# Patient Record
Sex: FEMALE | Race: WHITE | NOT HISPANIC OR LATINO | Employment: OTHER | URBAN - NONMETROPOLITAN AREA
[De-identification: names, ages, dates, MRNs, and addresses within clinical notes are randomized per-mention and may not be internally consistent; named-entity substitution may affect disease eponyms.]

---

## 2018-02-12 ENCOUNTER — HOSPITAL ENCOUNTER (OUTPATIENT)
Dept: GENERAL RADIOLOGY | Facility: HOSPITAL | Age: 73
Discharge: HOME | End: 2018-02-12
Attending: INTERNAL MEDICINE | Admitting: INTERNAL MEDICINE

## 2018-02-12 ENCOUNTER — TRANSCRIBE ORDERS (OUTPATIENT)
Dept: GENERAL RADIOLOGY | Facility: HOSPITAL | Age: 73
End: 2018-02-12

## 2018-02-12 DIAGNOSIS — M54.50 ACUTE BILATERAL LOW BACK PAIN WITHOUT SCIATICA: ICD-10-CM

## 2018-02-12 DIAGNOSIS — M54.50 ACUTE BILATERAL LOW BACK PAIN WITHOUT SCIATICA: Primary | ICD-10-CM

## 2018-02-12 PROCEDURE — 72072 X-RAY EXAM THORAC SPINE 3VWS: CPT

## 2018-02-12 PROCEDURE — 72072 X-RAY EXAM THORAC SPINE 3VWS: CPT | Performed by: RADIOLOGY

## 2018-02-12 PROCEDURE — 72110 X-RAY EXAM L-2 SPINE 4/>VWS: CPT

## 2018-02-12 PROCEDURE — 72110 X-RAY EXAM L-2 SPINE 4/>VWS: CPT | Performed by: RADIOLOGY

## 2019-04-30 ENCOUNTER — TRANSCRIBE ORDERS (OUTPATIENT)
Dept: ADMINISTRATIVE | Facility: HOSPITAL | Age: 74
End: 2019-04-30

## 2019-04-30 ENCOUNTER — LAB (OUTPATIENT)
Dept: LAB | Facility: HOSPITAL | Age: 74
End: 2019-04-30

## 2019-04-30 DIAGNOSIS — H66.91 RIGHT OTITIS MEDIA, UNSPECIFIED OTITIS MEDIA TYPE: Primary | ICD-10-CM

## 2019-04-30 DIAGNOSIS — H66.91 RIGHT OTITIS MEDIA, UNSPECIFIED OTITIS MEDIA TYPE: ICD-10-CM

## 2019-04-30 PROCEDURE — 87205 SMEAR GRAM STAIN: CPT

## 2019-04-30 PROCEDURE — 87070 CULTURE OTHR SPECIMN AEROBIC: CPT

## 2019-05-03 LAB
BACTERIA SPEC AEROBE CULT: NORMAL
GRAM STN SPEC: NORMAL

## 2019-07-29 ENCOUNTER — HOSPITAL ENCOUNTER (OUTPATIENT)
Dept: BONE DENSITY | Facility: HOSPITAL | Age: 74
Discharge: HOME | End: 2019-07-29
Admitting: NURSE PRACTITIONER

## 2019-07-29 DIAGNOSIS — M81.0 MENOPAUSAL OSTEOPOROSIS: ICD-10-CM

## 2019-07-29 PROCEDURE — 77080 DXA BONE DENSITY AXIAL: CPT

## 2019-07-29 PROCEDURE — 77080 DXA BONE DENSITY AXIAL: CPT | Performed by: RADIOLOGY

## 2020-07-23 ENCOUNTER — TRANSCRIBE ORDERS (OUTPATIENT)
Dept: ADMINISTRATIVE | Facility: HOSPITAL | Age: 75
End: 2020-07-23

## 2020-07-23 DIAGNOSIS — R42 DIZZINESS: Primary | ICD-10-CM

## 2020-07-23 DIAGNOSIS — G45.9 TRANSIENT ISCHEMIC ATTACK (TIA): ICD-10-CM

## 2020-07-28 ENCOUNTER — HOSPITAL ENCOUNTER (OUTPATIENT)
Dept: CARDIOLOGY | Facility: HOSPITAL | Age: 75
Discharge: HOME | End: 2020-07-28

## 2020-07-28 ENCOUNTER — HOSPITAL ENCOUNTER (OUTPATIENT)
Dept: CARDIOLOGY | Facility: HOSPITAL | Age: 75
Discharge: HOME | End: 2020-07-28
Admitting: INTERNAL MEDICINE

## 2020-07-28 DIAGNOSIS — G45.9 TRANSIENT ISCHEMIC ATTACK (TIA): ICD-10-CM

## 2020-07-28 DIAGNOSIS — R42 DIZZINESS: ICD-10-CM

## 2020-07-28 LAB
BH CV ECHO MEAS - % IVS THICK: 5.6 %
BH CV ECHO MEAS - % LVPW THICK: 57.8 %
BH CV ECHO MEAS - ACS: 2.2 CM
BH CV ECHO MEAS - AO MAX PG: 6.3 MMHG
BH CV ECHO MEAS - AO MEAN PG: 3 MMHG
BH CV ECHO MEAS - AO ROOT AREA (BSA CORRECTED): 1.9
BH CV ECHO MEAS - AO ROOT AREA: 9.1 CM^2
BH CV ECHO MEAS - AO ROOT DIAM: 3.4 CM
BH CV ECHO MEAS - AO V2 MAX: 125 CM/SEC
BH CV ECHO MEAS - AO V2 MEAN: 85.2 CM/SEC
BH CV ECHO MEAS - AO V2 VTI: 31.1 CM
BH CV ECHO MEAS - BSA(HAYCOCK): 1.8 M^2
BH CV ECHO MEAS - BSA: 1.8 M^2
BH CV ECHO MEAS - BZI_BMI: 26.1 KILOGRAMS/M^2
BH CV ECHO MEAS - BZI_METRIC_HEIGHT: 165.1 CM
BH CV ECHO MEAS - BZI_METRIC_WEIGHT: 71.2 KG
BH CV ECHO MEAS - EDV(CUBED): 73.6 ML
BH CV ECHO MEAS - EDV(MOD-SP4): 47.6 ML
BH CV ECHO MEAS - EDV(TEICH): 78.1 ML
BH CV ECHO MEAS - EF(CUBED): 61.4 %
BH CV ECHO MEAS - EF(MOD-SP4): 66.8 %
BH CV ECHO MEAS - EF(TEICH): 53.4 %
BH CV ECHO MEAS - ESV(CUBED): 28.4 ML
BH CV ECHO MEAS - ESV(MOD-SP4): 15.8 ML
BH CV ECHO MEAS - ESV(TEICH): 36.4 ML
BH CV ECHO MEAS - FS: 27.2 %
BH CV ECHO MEAS - IVS/LVPW: 1.2
BH CV ECHO MEAS - IVSD: 1.2 CM
BH CV ECHO MEAS - IVSS: 1.3 CM
BH CV ECHO MEAS - LA DIMENSION: 2.4 CM
BH CV ECHO MEAS - LA/AO: 0.71
BH CV ECHO MEAS - LV DIASTOLIC VOL/BSA (35-75): 26.7 ML/M^2
BH CV ECHO MEAS - LV MASS(C)D: 160.1 GRAMS
BH CV ECHO MEAS - LV MASS(C)DI: 89.7 GRAMS/M^2
BH CV ECHO MEAS - LV MASS(C)S: 150.5 GRAMS
BH CV ECHO MEAS - LV MASS(C)SI: 84.3 GRAMS/M^2
BH CV ECHO MEAS - LV SYSTOLIC VOL/BSA (12-30): 8.9 ML/M^2
BH CV ECHO MEAS - LVIDD: 4.2 CM
BH CV ECHO MEAS - LVIDS: 3.1 CM
BH CV ECHO MEAS - LVLD AP4: 6.9 CM
BH CV ECHO MEAS - LVLS AP4: 6 CM
BH CV ECHO MEAS - LVOT AREA (M): 3.1 CM^2
BH CV ECHO MEAS - LVOT AREA: 3.1 CM^2
BH CV ECHO MEAS - LVOT DIAM: 2 CM
BH CV ECHO MEAS - LVPWD: 1 CM
BH CV ECHO MEAS - LVPWS: 1.6 CM
BH CV ECHO MEAS - MV A MAX VEL: 67.8 CM/SEC
BH CV ECHO MEAS - MV E MAX VEL: 72.7 CM/SEC
BH CV ECHO MEAS - MV E/A: 1.1
BH CV ECHO MEAS - PA ACC TIME: 0.11 SEC
BH CV ECHO MEAS - PA PR(ACCEL): 28.2 MMHG
BH CV ECHO MEAS - RAP SYSTOLE: 10 MMHG
BH CV ECHO MEAS - RVSP: 28.4 MMHG
BH CV ECHO MEAS - SI(AO): 158.2 ML/M^2
BH CV ECHO MEAS - SI(CUBED): 25.3 ML/M^2
BH CV ECHO MEAS - SI(MOD-SP4): 17.8 ML/M^2
BH CV ECHO MEAS - SI(TEICH): 23.4 ML/M^2
BH CV ECHO MEAS - SV(AO): 282.4 ML
BH CV ECHO MEAS - SV(CUBED): 45.2 ML
BH CV ECHO MEAS - SV(MOD-SP4): 31.8 ML
BH CV ECHO MEAS - SV(TEICH): 41.7 ML
BH CV ECHO MEAS - TR MAX VEL: 214.7 CM/SEC
MAXIMAL PREDICTED HEART RATE: 144 BPM
STRESS TARGET HR: 122 BPM

## 2020-07-28 PROCEDURE — 93306 TTE W/DOPPLER COMPLETE: CPT | Performed by: INTERNAL MEDICINE

## 2020-07-28 PROCEDURE — 93306 TTE W/DOPPLER COMPLETE: CPT

## 2020-07-28 PROCEDURE — 93880 EXTRACRANIAL BILAT STUDY: CPT | Performed by: RADIOLOGY

## 2020-07-28 PROCEDURE — 93880 EXTRACRANIAL BILAT STUDY: CPT

## 2020-08-19 ENCOUNTER — OFFICE VISIT (OUTPATIENT)
Dept: CARDIAC SURGERY | Facility: CLINIC | Age: 75
End: 2020-08-19

## 2020-08-19 VITALS
BODY MASS INDEX: 24.49 KG/M2 | OXYGEN SATURATION: 97 % | SYSTOLIC BLOOD PRESSURE: 170 MMHG | WEIGHT: 147 LBS | HEIGHT: 65 IN | HEART RATE: 70 BPM | TEMPERATURE: 97.5 F | DIASTOLIC BLOOD PRESSURE: 110 MMHG

## 2020-08-19 DIAGNOSIS — I65.22 STENOSIS OF LEFT CAROTID ARTERY: Primary | ICD-10-CM

## 2020-08-19 PROCEDURE — 99204 OFFICE O/P NEW MOD 45 MIN: CPT | Performed by: THORACIC SURGERY (CARDIOTHORACIC VASCULAR SURGERY)

## 2020-08-19 RX ORDER — SIMVASTATIN 10 MG
10 TABLET ORAL EVERY OTHER DAY
COMMUNITY
Start: 2015-04-08

## 2020-08-19 RX ORDER — ASPIRIN 81 MG/1
81 TABLET ORAL EVERY 4 HOURS PRN
COMMUNITY

## 2020-08-19 RX ORDER — LISINOPRIL 10 MG/1
10 TABLET ORAL DAILY
COMMUNITY

## 2020-08-19 NOTE — PROGRESS NOTES
08/19/2020  Patient Information  [place] [address]   1945  'PCP/Referring Physician'  Dustin Cruz MD  [phone]  Dustin Cruz MD  [phone]  Chief Complaint   Patient presents with   • Consult     NP per Dr. Dustin Cruz for Left Carotid Stenosis-Right Sided Facial Drooping       History of Present Illness: 75-year-old  female with a history of hypertension, hyperlipidemia and prediabetes who presents with perioral numbness.  Approximately 2 months ago, the patient had a transient episode of right perioral numbness lasting approximately 1 hour with spontaneous resolution.  The patient had a repeat episode approximately a month ago.  She denies any vision loss, speaking difficulties or focal weakness.  The patient's father had an occluded carotid artery and her mother had significant carotid artery stenosis that was stented with periprocedural stroke.      There is no problem list on file for this patient.    Past Medical History:   Diagnosis Date   • Arthritis    • Carotid stenosis    • GERD (gastroesophageal reflux disease)    • Hyperlipidemia    • Hypertension      Past Surgical History:   Procedure Laterality Date   • CATARACT EXTRACTION, BILATERAL     • GALLBLADDER SURGERY     • HYSTERECTOMY         Current Outpatient Medications:   •  aspirin 81 MG EC tablet, Take 81 mg by mouth Daily., Disp: , Rfl:   •  lisinopril (PRINIVIL,ZESTRIL) 10 MG tablet, Take 10 mg by mouth Daily., Disp: , Rfl:   •  simvastatin (ZOCOR) 10 MG tablet, Take  by mouth Every Other Day., Disp: , Rfl:   Allergies   Allergen Reactions   • Penicillins Hives     Social History     Socioeconomic History   • Marital status:      Spouse name: Not on file   • Number of children: 3   • Years of education: Not on file   • Highest education level: Not on file   Occupational History   •  "Occupation:      Comment: Retired   Tobacco Use   • Smoking status: Never Smoker   • Smokeless tobacco: Never Used   Substance and Sexual Activity   • Alcohol use: Not Currently     Frequency: Never   • Drug use: Never   Social History Narrative    Lives in [place].      Family History   Problem Relation Age of Onset   • Thyroid cancer Mother    • Kidney cancer Mother    • Other Father      Review of Systems   Constitution: Positive for malaise/fatigue. Negative for chills, fever, night sweats and weight loss.   HENT: Negative for hearing loss, odynophagia and sore throat.    Cardiovascular: Positive for leg swelling. Negative for chest pain, dyspnea on exertion, orthopnea and palpitations.   Respiratory: Negative for cough and hemoptysis.    Endocrine: Negative for cold intolerance, heat intolerance, polydipsia, polyphagia and polyuria.   Hematologic/Lymphatic: Does not bruise/bleed easily.   Skin: Negative for itching and rash.   Musculoskeletal: Positive for arthritis and joint pain. Negative for joint swelling and myalgias.   Gastrointestinal: Negative for abdominal pain, constipation, diarrhea, hematemesis, hematochezia, melena, nausea and vomiting.   Genitourinary: Negative for dysuria, frequency and hematuria.   Neurological: Positive for light-headedness. Negative for focal weakness, headaches, numbness and seizures.   Psychiatric/Behavioral: Negative for suicidal ideas.   All other systems reviewed and are negative.    Vitals:    08/19/20 0926   BP: (!) 170/110   BP Location: Left arm   Patient Position: Sitting   Pulse: 70   Temp: 97.5 °F (36.4 °C)   SpO2: 97%   Weight: 66.7 kg (147 lb)   Height: 165.1 cm (65\")      Physical Exam   Constitutional: She is oriented to person, place, and time. She appears well-developed and well-nourished. No distress.    female who appears stated age and is present with her    HENT:   Head: Normocephalic and atraumatic.   Eyes: Conjunctivae are " normal. No scleral icterus.   Neck: Normal range of motion. No JVD present. Carotid bruit is not present. No tracheal deviation present.   Cardiovascular: Normal rate, regular rhythm and normal heart sounds. Exam reveals no gallop and no friction rub.   No murmur heard.  Pulmonary/Chest: Effort normal and breath sounds normal. No stridor. No respiratory distress. She has no wheezes. She has no rales.   Abdominal: Soft. She exhibits no distension and no mass. There is no tenderness. There is no rebound and no guarding.   Musculoskeletal: Normal range of motion. She exhibits no edema.   Neurological: She is alert and oriented to person, place, and time.   Skin: Skin is warm and dry. No rash noted. She is not diaphoretic. No erythema.   Psychiatric: She has a normal mood and affect. Her behavior is normal. Judgment and thought content normal.       The past medical history, surgical history, family history, social history, review of systems and vitals were reviewed by myself and corrected as needed.      Labs/Imaging:  -Carotid duplex performed 7/28/2020, personally reviewed, demonstrates less than 50% right carotid artery stenosis and greater than 80% left internal carotid artery stenosis.  There is antegrade vertebral flow bilaterally.  The left internal carotid artery peak systolic velocity is 396 cm/s    Assessment/Plan:  75-year-old  female with a history of hypertension, hyperlipidemia and prediabetes who presents with perioral numbness.  She has significant left carotid artery stenosis.  I will obtain a CTA of the carotids to better evaluate her anatomy before surgery.  An MRI of the brain will be obtained to rule out stroke.  I discussed with the patient performing a left carotid endarterectomy.  The risks and benefits of surgery were discussed with the patient including pain, bleeding, infection, stroke, hoarseness, dysphasia, myocardial infarction and death.  The patient understood these risks and  wished to proceed with surgery.    There is no problem list on file for this patient.

## 2020-09-08 ENCOUNTER — HOSPITAL ENCOUNTER (OUTPATIENT)
Dept: MRI IMAGING | Facility: HOSPITAL | Age: 75
Discharge: HOME | End: 2020-09-08
Admitting: THORACIC SURGERY (CARDIOTHORACIC VASCULAR SURGERY)

## 2020-09-08 PROCEDURE — 70551 MRI BRAIN STEM W/O DYE: CPT | Performed by: RADIOLOGY

## 2020-09-08 PROCEDURE — 70551 MRI BRAIN STEM W/O DYE: CPT

## 2020-09-11 ENCOUNTER — HOSPITAL ENCOUNTER (OUTPATIENT)
Dept: CT IMAGING | Facility: HOSPITAL | Age: 75
Discharge: HOME | End: 2020-09-11
Admitting: THORACIC SURGERY (CARDIOTHORACIC VASCULAR SURGERY)

## 2020-09-11 LAB — CREAT BLDA-MCNC: 0.7 MG/DL (ref 0.6–1.3)

## 2020-09-11 PROCEDURE — 70498 CT ANGIOGRAPHY NECK: CPT | Performed by: RADIOLOGY

## 2020-09-11 PROCEDURE — 70498 CT ANGIOGRAPHY NECK: CPT

## 2020-09-11 PROCEDURE — 82565 ASSAY OF CREATININE: CPT

## 2020-09-11 PROCEDURE — 0 IOVERSOL 68 % SOLUTION: Performed by: THORACIC SURGERY (CARDIOTHORACIC VASCULAR SURGERY)

## 2020-09-11 RX ADMIN — IOVERSOL 80 ML: 678 INJECTION INTRA-ARTERIAL; INTRAVENOUS at 11:47

## 2020-09-15 ENCOUNTER — TELEPHONE (OUTPATIENT)
Dept: CARDIAC SURGERY | Facility: CLINIC | Age: 75
End: 2020-09-15

## 2020-09-15 NOTE — TELEPHONE ENCOUNTER
----- Message from Gerardo Paige MD sent at 9/14/2020  4:44 PM EDT -----  Ok to book.  Thanks  ----- Message -----  From: Jacobo Dinh  Sent: 9/14/2020   1:50 PM EDT  To: MD Dr. Grecia Malcolm, you saw this pt on 08/19/2020-she had a CTA Carotid & MRI Brain -both are now in Epic for review. Is it ok to proceed with L CEA? Thanks.

## 2020-09-15 NOTE — TELEPHONE ENCOUNTER
I called Delia today to try and schedule her L CEA procedure with Dr. Paige. Delia had a CTA or the carotids & MRI on the brain-please refer to Dr. Paige Tele enc response from 09/15. Delia was very persistent that she see Dr. Paige again before I schedule surgery. I explained that for an apt at our [place] office would be out into November but I could get here in at the [place] office in October. She agreed to come to [place], she is aware of that apt date and time.

## 2020-10-20 ENCOUNTER — OFFICE VISIT (OUTPATIENT)
Dept: CARDIAC SURGERY | Facility: CLINIC | Age: 75
End: 2020-10-20

## 2020-10-20 VITALS
OXYGEN SATURATION: 98 % | DIASTOLIC BLOOD PRESSURE: 101 MMHG | TEMPERATURE: 97.2 F | SYSTOLIC BLOOD PRESSURE: 176 MMHG | HEIGHT: 65 IN | BODY MASS INDEX: 24.49 KG/M2 | WEIGHT: 147 LBS | HEART RATE: 71 BPM

## 2020-10-20 DIAGNOSIS — I65.22 STENOSIS OF LEFT CAROTID ARTERY: Primary | ICD-10-CM

## 2020-10-20 PROCEDURE — 99213 OFFICE O/P EST LOW 20 MIN: CPT | Performed by: NURSE PRACTITIONER

## 2020-10-20 NOTE — PROGRESS NOTES
[facility] Cardiothoracic Surgery Office Follow Up Note     Date of Encounter: 10/20/2020     MRN Number: [id number]  Name: Delia Rodriguez  Phone Number: [phone]     Referred By: No ref. provider found  PCP: Dustin Cruz MD    Chief Complaint:    Chief Complaint   Patient presents with   • Follow-up     Follow up to discuss left carotid endarterectomy.   • Carotid Artery Disease       History of Present Illness:    Delia Rodriguez is a 75 y.o. female with a history of hyperlipidemia, hypertension, prediabetes and left carotid stenosis.  She presents today for review of her CTA of the carotids and MRI brain as well as discussion of left carotid endarterectomy.   MRI of the brain 9/8/2020 with age-appropriate cerebral atrophy and no areas of ischemia.  CTA of the carotids revealed no significant stenosis stenosis in the right carotid artery and high-grade 75% stenosis of the proximal left internal carotid artery.  Patient with history of perioral numbness.  She denies any new TIA/CVA symptoms.  Patient's father had occluded carotid artery and her mother had a significant carotid artery stenosis that was stented with periprocedural stroke.  Patient has had uncontrolled blood pressures prior to her dental procedure recently in which it was canceled. Elevated blood pressure in office today of 176/101.  She has not followed up with her primary care provider as advised by her dentist for evaluation.    Review of Systems:  Review of Systems   Constitution: Positive for malaise/fatigue. Negative for chills, decreased appetite, diaphoresis, fever, night sweats and weight loss.   HENT: Negative for congestion, hoarse voice, sore throat and stridor.    Cardiovascular: Positive for leg swelling (ankles). Negative for chest pain, claudication, dyspnea on exertion, irregular heartbeat, near-syncope, orthopnea, palpitations, paroxysmal nocturnal dyspnea and syncope.   Respiratory: Negative.  Negative for  "cough, hemoptysis, shortness of breath, sleep disturbances due to breathing, snoring, sputum production and wheezing.    Hematologic/Lymphatic: Negative for adenopathy and bleeding problem. Bruises/bleeds easily.   Skin: Negative.  Negative for color change, dry skin, itching, poor wound healing and rash.   Musculoskeletal: Positive for arthritis. Negative for back pain, falls and muscle weakness.   Gastrointestinal: Positive for abdominal pain. Negative for anorexia, constipation, diarrhea, hematochezia, melena, nausea and vomiting.   Neurological: Positive for dizziness, headaches, light-headedness and loss of balance. Negative for difficulty with concentration, disturbances in coordination, numbness, seizures, vertigo and weakness.   Psychiatric/Behavioral: Negative.  Negative for altered mental status, depression, memory loss and substance abuse. The patient does not have insomnia and is not nervous/anxious.    Allergic/Immunologic: Positive for environmental allergies. Negative for persistent infections.       I have reviewed the review of systems as entered by my clinical support staff and have updated it as appropriate.       Allergies:  Allergies   Allergen Reactions   • Penicillins Hives       Medications:      Current Outpatient Medications:   •  aspirin 81 MG EC tablet, Take 81 mg by mouth Daily., Disp: , Rfl:   •  lisinopril (PRINIVIL,ZESTRIL) 10 MG tablet, Take 10 mg by mouth Daily., Disp: , Rfl:   •  simvastatin (ZOCOR) 10 MG tablet, Take  by mouth Every Other Day., Disp: , Rfl:     Physical Exam:  Vital Signs:    Vitals:    10/20/20 1028   BP: (!) 176/101   BP Location: Right arm   Patient Position: Sitting   Pulse: 71   Temp: 97.2 °F (36.2 °C)   SpO2: 98%   Weight: 66.7 kg (147 lb)   Height: 165.1 cm (65\")     Body mass index is 24.46 kg/m².     Physical Exam  Vitals signs and nursing note reviewed.   Constitutional:       Appearance: Normal appearance.   HENT:      Head: Normocephalic and " atraumatic.      Nose: Nose normal.      Mouth/Throat:      Mouth: Mucous membranes are dry.   Neck:      Musculoskeletal: Neck supple.      Vascular: Carotid bruit (Left) present.   Cardiovascular:      Rate and Rhythm: Normal rate and regular rhythm.      Pulses:           Dorsalis pedis pulses are 2+ on the right side and 2+ on the left side.        Posterior tibial pulses are 2+ on the right side and 2+ on the left side.      Heart sounds: Normal heart sounds, S1 normal and S2 normal. No murmur. No friction rub. No gallop.    Pulmonary:      Effort: Pulmonary effort is normal.      Breath sounds: Normal breath sounds.   Abdominal:      General: Bowel sounds are normal.      Palpations: Abdomen is soft.      Tenderness: There is no abdominal tenderness.   Musculoskeletal: Normal range of motion.      Right lower leg: No edema.      Left lower leg: No edema.   Skin:     General: Skin is warm and dry.   Neurological:      General: No focal deficit present.      Mental Status: She is alert and oriented to person, place, and time.      Motor: Motor function is intact. No weakness.      Coordination: Coordination is intact.      Gait: Gait is intact.   Psychiatric:         Mood and Affect: Mood normal.         Behavior: Behavior normal. Behavior is cooperative.         Thought Content: Thought content normal.         Judgment: Judgment normal.         Labs/Imaging:    MRI Brain 9/8/20:  Age appropriate cerebral atrophy, no areas of recent ischemia.  No focal brain parenchymal abnormalities were identified  CT angio carotid 9/11/20:  No significant stenosis of right carotid.  High-grade 75 % stenosis of the left proximal internal carotid artery      Assessment / Plan:  Delia Rodriguez is a 75 y.o. female with a history of hyperlipidemia, hypertension, prediabetes and left carotid stenosis.  Pt with noted High-grade 75 % stenosis of the left proximal internal carotid artery.  She denies any new TIA/CVA symptoms.   Advised patient to call primary care provider and make appointment for uncontrolled blood pressure and her  was able to make appointment for Thursday, during office visit, for evaluation prior to surgery.  Will have patient return for left carotid endarterectomy with EEG.  The risk and benefits of surgery were discussed with the patient/family including pain, bleeding, infection, stroke, hoarseness, dysphagia, myocardial infarction and death.  Patient understands these risks and wishes to proceed.    Starr Richter, [facility] Cardiothoracic Surgery    Please note that portions of this note may have been completed with a voice recognition program. Efforts were made to edit the dictations, but occasionally words are mistranscribed.

## 2020-10-30 ENCOUNTER — PREP FOR SURGERY (OUTPATIENT)
Dept: OTHER | Facility: HOSPITAL | Age: 75
End: 2020-10-30

## 2020-10-30 DIAGNOSIS — I65.22 STENOSIS OF LEFT CAROTID ARTERY: Primary | ICD-10-CM

## 2020-11-02 ENCOUNTER — PRE-ADMISSION TESTING (OUTPATIENT)
Dept: PREADMISSION TESTING | Facility: HOSPITAL | Age: 75
End: 2020-11-02

## 2020-11-02 ENCOUNTER — HOSPITAL ENCOUNTER (OUTPATIENT)
Dept: GENERAL RADIOLOGY | Facility: HOSPITAL | Age: 75
Discharge: HOME | End: 2020-11-02

## 2020-11-02 ENCOUNTER — APPOINTMENT (OUTPATIENT)
Dept: PREADMISSION TESTING | Facility: HOSPITAL | Age: 75
End: 2020-11-02

## 2020-11-02 VITALS — WEIGHT: 145.6 LBS | BODY MASS INDEX: 23.4 KG/M2 | HEIGHT: 66 IN

## 2020-11-02 DIAGNOSIS — I65.22 STENOSIS OF LEFT CAROTID ARTERY: ICD-10-CM

## 2020-11-02 LAB
ABO GROUP BLD: NORMAL
ANION GAP SERPL CALCULATED.3IONS-SCNC: 13 MMOL/L (ref 5–15)
BLD GP AB SCN SERPL QL: NEGATIVE
BUN SERPL-MCNC: 15 MG/DL (ref 8–23)
BUN/CREAT SERPL: 23.4 (ref 7–25)
CALCIUM SPEC-SCNC: 9.7 MG/DL (ref 8.6–10.5)
CHLORIDE SERPL-SCNC: 102 MMOL/L (ref 98–107)
CO2 SERPL-SCNC: 26 MMOL/L (ref 22–29)
CREAT SERPL-MCNC: 0.64 MG/DL (ref 0.57–1)
DEPRECATED RDW RBC AUTO: 48 FL (ref 37–54)
ERYTHROCYTE [DISTWIDTH] IN BLOOD BY AUTOMATED COUNT: 14.7 % (ref 12.3–15.4)
GFR SERPL CREATININE-BSD FRML MDRD: 90 ML/MIN/1.73
GLUCOSE SERPL-MCNC: 152 MG/DL (ref 65–99)
HBA1C MFR BLD: 5.8 % (ref 4.8–5.6)
HCT VFR BLD AUTO: 49.3 % (ref 34–46.6)
HGB BLD-MCNC: 15.2 G/DL (ref 12–15.9)
INR PPP: 0.98 (ref 0.85–1.16)
MCH RBC QN AUTO: 27.3 PG (ref 26.6–33)
MCHC RBC AUTO-ENTMCNC: 30.8 G/DL (ref 31.5–35.7)
MCV RBC AUTO: 88.7 FL (ref 79–97)
PLATELET # BLD AUTO: 442 10*3/MM3 (ref 140–450)
PMV BLD AUTO: 9.7 FL (ref 6–12)
POTASSIUM SERPL-SCNC: 4 MMOL/L (ref 3.5–5.2)
PROTHROMBIN TIME: 12.7 SECONDS (ref 11.5–14)
RBC # BLD AUTO: 5.56 10*6/MM3 (ref 3.77–5.28)
RH BLD: POSITIVE
SODIUM SERPL-SCNC: 141 MMOL/L (ref 136–145)
T&S EXPIRATION DATE: NORMAL
WBC # BLD AUTO: 10.91 10*3/MM3 (ref 3.4–10.8)

## 2020-11-02 PROCEDURE — 86850 RBC ANTIBODY SCREEN: CPT | Performed by: PHYSICIAN ASSISTANT

## 2020-11-02 PROCEDURE — 80048 BASIC METABOLIC PNL TOTAL CA: CPT | Performed by: PHYSICIAN ASSISTANT

## 2020-11-02 PROCEDURE — U0004 COV-19 TEST NON-CDC HGH THRU: HCPCS

## 2020-11-02 PROCEDURE — 83036 HEMOGLOBIN GLYCOSYLATED A1C: CPT | Performed by: PHYSICIAN ASSISTANT

## 2020-11-02 PROCEDURE — 86901 BLOOD TYPING SEROLOGIC RH(D): CPT | Performed by: PHYSICIAN ASSISTANT

## 2020-11-02 PROCEDURE — 36415 COLL VENOUS BLD VENIPUNCTURE: CPT

## 2020-11-02 PROCEDURE — 86900 BLOOD TYPING SEROLOGIC ABO: CPT | Performed by: PHYSICIAN ASSISTANT

## 2020-11-02 PROCEDURE — 85610 PROTHROMBIN TIME: CPT | Performed by: PHYSICIAN ASSISTANT

## 2020-11-02 PROCEDURE — 85027 COMPLETE CBC AUTOMATED: CPT | Performed by: PHYSICIAN ASSISTANT

## 2020-11-02 PROCEDURE — 71046 X-RAY EXAM CHEST 2 VIEWS: CPT

## 2020-11-02 PROCEDURE — 93010 ELECTROCARDIOGRAM REPORT: CPT | Performed by: INTERNAL MEDICINE

## 2020-11-02 PROCEDURE — C9803 HOPD COVID-19 SPEC COLLECT: HCPCS

## 2020-11-02 PROCEDURE — 93005 ELECTROCARDIOGRAM TRACING: CPT

## 2020-11-02 RX ORDER — CHLORHEXIDINE GLUCONATE 500 MG/1
1 CLOTH TOPICAL EVERY 12 HOURS PRN
Status: CANCELLED | OUTPATIENT
Start: 2020-11-02

## 2020-11-02 RX ORDER — AMLODIPINE BESYLATE 5 MG/1
5 TABLET ORAL DAILY
COMMUNITY
End: 2023-03-08

## 2020-11-02 RX ORDER — HYDRALAZINE HYDROCHLORIDE 25 MG/1
25 TABLET, FILM COATED ORAL 3 TIMES DAILY
COMMUNITY
End: 2023-03-08 | Stop reason: ALTCHOICE

## 2020-11-02 RX ORDER — VANCOMYCIN HYDROCHLORIDE 1 G/200ML
15 INJECTION, SOLUTION INTRAVENOUS ONCE
Status: CANCELLED | OUTPATIENT
Start: 2020-11-05 | End: 2020-11-05

## 2020-11-02 NOTE — PAT
Patient to apply Chlorhexadine wipes  to surgical area (as instructed) the night before procedure and the AM of procedure. Wipes provided.    Per Anesthesia Request, patient instructed not to take their ACE/ARB medications on the AM of surgery.    Blood bank bracelet applied to patient during Pre Admission Testing visit.  Patient instructed not to remove from arm until after procedure and they are discharged from the hospital.  Explained to patient that they may shower and get the bracelet wet, but not to immerse under water for longer periods (bathing, swimming, hand dishwashing, etc).  Patient verbalized understanding.

## 2020-11-03 LAB
QT INTERVAL: 360 MS
QTC INTERVAL: 423 MS
SARS-COV-2 RNA RESP QL NAA+PROBE: NOT DETECTED

## 2020-11-04 ENCOUNTER — TELEPHONE (OUTPATIENT)
Dept: CARDIAC SURGERY | Facility: CLINIC | Age: 75
End: 2020-11-04

## 2020-11-04 ENCOUNTER — ANESTHESIA EVENT (OUTPATIENT)
Dept: PERIOP | Facility: HOSPITAL | Age: 75
End: 2020-11-04

## 2020-11-04 RX ORDER — FAMOTIDINE 10 MG/ML
20 INJECTION, SOLUTION INTRAVENOUS ONCE
Status: CANCELLED | OUTPATIENT
Start: 2020-11-04 | End: 2020-11-04

## 2020-11-04 NOTE — TELEPHONE ENCOUNTER
Delia is aware of her new arrival time for her surgery with Dr. Paige on 11/05-new arrival time is 10:00am.

## 2020-11-05 ENCOUNTER — APPOINTMENT (OUTPATIENT)
Dept: NEUROLOGY | Facility: HOSPITAL | Age: 75
End: 2020-11-05

## 2020-11-05 ENCOUNTER — HOSPITAL ENCOUNTER (INPATIENT)
Facility: HOSPITAL | Age: 75
LOS: 1 days | Discharge: HOME | End: 2020-11-06
Attending: THORACIC SURGERY (CARDIOTHORACIC VASCULAR SURGERY) | Admitting: THORACIC SURGERY (CARDIOTHORACIC VASCULAR SURGERY)

## 2020-11-05 ENCOUNTER — ANESTHESIA (OUTPATIENT)
Dept: PERIOP | Facility: HOSPITAL | Age: 75
End: 2020-11-05

## 2020-11-05 DIAGNOSIS — I65.22 STENOSIS OF LEFT CAROTID ARTERY: ICD-10-CM

## 2020-11-05 LAB
GLUCOSE BLDC GLUCOMTR-MCNC: 114 MG/DL (ref 70–130)
GLUCOSE BLDC GLUCOMTR-MCNC: 139 MG/DL (ref 70–130)
GLUCOSE BLDC GLUCOMTR-MCNC: 183 MG/DL (ref 70–130)

## 2020-11-05 PROCEDURE — 25010000002 ONDANSETRON PER 1 MG: Performed by: NURSE ANESTHETIST, CERTIFIED REGISTERED

## 2020-11-05 PROCEDURE — 25010000002 PROPOFOL 10 MG/ML EMULSION: Performed by: NURSE ANESTHETIST, CERTIFIED REGISTERED

## 2020-11-05 PROCEDURE — 25010000002 HEPARIN (PORCINE) PER 1000 UNITS: Performed by: THORACIC SURGERY (CARDIOTHORACIC VASCULAR SURGERY)

## 2020-11-05 PROCEDURE — 25010000002 PHENYLEPHRINE PER 1 ML: Performed by: NURSE ANESTHETIST, CERTIFIED REGISTERED

## 2020-11-05 PROCEDURE — 25010000002 FENTANYL CITRATE (PF) 100 MCG/2ML SOLUTION: Performed by: NURSE ANESTHETIST, CERTIFIED REGISTERED

## 2020-11-05 PROCEDURE — 88311 DECALCIFY TISSUE: CPT | Performed by: THORACIC SURGERY (CARDIOTHORACIC VASCULAR SURGERY)

## 2020-11-05 PROCEDURE — 25010000002 NEOSTIGMINE 10 MG/10ML SOLUTION: Performed by: NURSE ANESTHETIST, CERTIFIED REGISTERED

## 2020-11-05 PROCEDURE — 03CL0ZZ EXTIRPATION OF MATTER FROM LEFT INTERNAL CAROTID ARTERY, OPEN APPROACH: ICD-10-PCS | Performed by: THORACIC SURGERY (CARDIOTHORACIC VASCULAR SURGERY)

## 2020-11-05 PROCEDURE — 25010000002 DEXAMETHASONE PER 1 MG: Performed by: NURSE ANESTHETIST, CERTIFIED REGISTERED

## 2020-11-05 PROCEDURE — 95955 EEG DURING SURGERY: CPT

## 2020-11-05 PROCEDURE — 35301 RECHANNELING OF ARTERY: CPT | Performed by: PHYSICIAN ASSISTANT

## 2020-11-05 PROCEDURE — 03CJ0ZZ EXTIRPATION OF MATTER FROM LEFT COMMON CAROTID ARTERY, OPEN APPROACH: ICD-10-PCS | Performed by: THORACIC SURGERY (CARDIOTHORACIC VASCULAR SURGERY)

## 2020-11-05 PROCEDURE — 95816 EEG AWAKE AND DROWSY: CPT

## 2020-11-05 PROCEDURE — 99233 SBSQ HOSP IP/OBS HIGH 50: CPT | Performed by: INTERNAL MEDICINE

## 2020-11-05 PROCEDURE — 25010000002 HEPARIN (PORCINE) PER 1000 UNITS: Performed by: NURSE ANESTHETIST, CERTIFIED REGISTERED

## 2020-11-05 PROCEDURE — 82962 GLUCOSE BLOOD TEST: CPT

## 2020-11-05 PROCEDURE — 25010000003 LIDOCAINE 1 % SOLUTION: Performed by: THORACIC SURGERY (CARDIOTHORACIC VASCULAR SURGERY)

## 2020-11-05 PROCEDURE — C1768 GRAFT, VASCULAR: HCPCS | Performed by: THORACIC SURGERY (CARDIOTHORACIC VASCULAR SURGERY)

## 2020-11-05 PROCEDURE — 35301 RECHANNELING OF ARTERY: CPT | Performed by: THORACIC SURGERY (CARDIOTHORACIC VASCULAR SURGERY)

## 2020-11-05 PROCEDURE — 25010000002 VANCOMYCIN PER 500 MG: Performed by: PHYSICIAN ASSISTANT

## 2020-11-05 PROCEDURE — 88304 TISSUE EXAM BY PATHOLOGIST: CPT | Performed by: THORACIC SURGERY (CARDIOTHORACIC VASCULAR SURGERY)

## 2020-11-05 PROCEDURE — 94799 UNLISTED PULMONARY SVC/PX: CPT

## 2020-11-05 DEVICE — VASCU-GUARD PERIPHERAL VASCULAR PATCH IS PREPARED FROM BOVINE PERICARDIUM WHICH IS CROSS-LINKED WITH GLUTARALDEHYDE. THE PERICARDIUM IS PROCURED FROM CATTLE ORIGINATING IN THE UNITED STATES. VASCU-GUARD PERIPHERAL VASCULAR PATCH IS CHEMICALLY STERILIZED USING ETHANOL AND PROPYLENE OXIDE. VASCU-GUARD PERIPHERAL VASCULAR PATCH HAS BEEN TREATED WITH 1 MOLAR SODIUM HYDROXIDE FOR A MINIMUM OF 60 MINUTES AT 20 - 25 C.  VASCU-GUARD PERIPHERAL VASCULAR PATCH IS PACKAGED IN A CONTAINER FILLED WITH STERILE, NON-PYROGENIC WATER CONTAINING PROPYLENE OXIDE. THE CONTENTS OF THE UNOPENED, UNDAMAGED CONTAINER ARE STERILE.
Type: IMPLANTABLE DEVICE | Site: NECK | Status: FUNCTIONAL
Brand: VASCU-GUARD PERIPHERAL VASCULAR PATCH

## 2020-11-05 RX ORDER — SODIUM CHLORIDE, SODIUM LACTATE, POTASSIUM CHLORIDE, CALCIUM CHLORIDE 600; 310; 30; 20 MG/100ML; MG/100ML; MG/100ML; MG/100ML
9 INJECTION, SOLUTION INTRAVENOUS CONTINUOUS
Status: DISCONTINUED | OUTPATIENT
Start: 2020-11-05 | End: 2020-11-06 | Stop reason: HOSPADM

## 2020-11-05 RX ORDER — SODIUM CHLORIDE 0.9 % (FLUSH) 0.9 %
10 SYRINGE (ML) INJECTION AS NEEDED
Status: DISCONTINUED | OUTPATIENT
Start: 2020-11-05 | End: 2020-11-05 | Stop reason: HOSPADM

## 2020-11-05 RX ORDER — ATORVASTATIN CALCIUM 10 MG/1
10 TABLET, FILM COATED ORAL NIGHTLY
Status: DISCONTINUED | OUTPATIENT
Start: 2020-11-05 | End: 2020-11-06 | Stop reason: HOSPADM

## 2020-11-05 RX ORDER — LIDOCAINE HYDROCHLORIDE 10 MG/ML
0.5 INJECTION, SOLUTION EPIDURAL; INFILTRATION; INTRACAUDAL; PERINEURAL ONCE AS NEEDED
Status: COMPLETED | OUTPATIENT
Start: 2020-11-05 | End: 2020-11-05

## 2020-11-05 RX ORDER — SODIUM CHLORIDE 0.9 % (FLUSH) 0.9 %
10 SYRINGE (ML) INJECTION AS NEEDED
Status: DISCONTINUED | OUTPATIENT
Start: 2020-11-05 | End: 2020-11-06 | Stop reason: HOSPADM

## 2020-11-05 RX ORDER — HYDROMORPHONE HYDROCHLORIDE 1 MG/ML
0.5 INJECTION, SOLUTION INTRAMUSCULAR; INTRAVENOUS; SUBCUTANEOUS
Status: DISCONTINUED | OUTPATIENT
Start: 2020-11-05 | End: 2020-11-05 | Stop reason: HOSPADM

## 2020-11-05 RX ORDER — SODIUM CHLORIDE 0.9 % (FLUSH) 0.9 %
3 SYRINGE (ML) INJECTION EVERY 12 HOURS SCHEDULED
Status: DISCONTINUED | OUTPATIENT
Start: 2020-11-05 | End: 2020-11-06 | Stop reason: HOSPADM

## 2020-11-05 RX ORDER — PHENYLEPHRINE HCL IN 0.9% NACL 0.5 MG/5ML
.5-3 SYRINGE (ML) INTRAVENOUS
Status: DISCONTINUED | OUTPATIENT
Start: 2020-11-05 | End: 2020-11-06 | Stop reason: HOSPADM

## 2020-11-05 RX ORDER — CLOPIDOGREL BISULFATE 75 MG/1
75 TABLET ORAL DAILY
Status: DISCONTINUED | OUTPATIENT
Start: 2020-11-06 | End: 2020-11-06 | Stop reason: HOSPADM

## 2020-11-05 RX ORDER — VANCOMYCIN HYDROCHLORIDE 1 G/200ML
15 INJECTION, SOLUTION INTRAVENOUS ONCE
Status: COMPLETED | OUTPATIENT
Start: 2020-11-05 | End: 2020-11-06

## 2020-11-05 RX ORDER — PROPOFOL 10 MG/ML
VIAL (ML) INTRAVENOUS AS NEEDED
Status: DISCONTINUED | OUTPATIENT
Start: 2020-11-05 | End: 2020-11-05 | Stop reason: SURG

## 2020-11-05 RX ORDER — DEXAMETHASONE SODIUM PHOSPHATE 4 MG/ML
INJECTION, SOLUTION INTRA-ARTICULAR; INTRALESIONAL; INTRAMUSCULAR; INTRAVENOUS; SOFT TISSUE AS NEEDED
Status: DISCONTINUED | OUTPATIENT
Start: 2020-11-05 | End: 2020-11-05 | Stop reason: SURG

## 2020-11-05 RX ORDER — MORPHINE SULFATE 2 MG/ML
2 INJECTION, SOLUTION INTRAMUSCULAR; INTRAVENOUS EVERY 4 HOURS PRN
Status: DISCONTINUED | OUTPATIENT
Start: 2020-11-05 | End: 2020-11-06 | Stop reason: HOSPADM

## 2020-11-05 RX ORDER — ROCURONIUM BROMIDE 10 MG/ML
INJECTION, SOLUTION INTRAVENOUS AS NEEDED
Status: DISCONTINUED | OUTPATIENT
Start: 2020-11-05 | End: 2020-11-05 | Stop reason: SURG

## 2020-11-05 RX ORDER — SODIUM CHLORIDE 450 MG/100ML
30 INJECTION, SOLUTION INTRAVENOUS CONTINUOUS
Status: DISCONTINUED | OUTPATIENT
Start: 2020-11-05 | End: 2020-11-06 | Stop reason: HOSPADM

## 2020-11-05 RX ORDER — LISINOPRIL 10 MG/1
10 TABLET ORAL NIGHTLY
Status: DISCONTINUED | OUTPATIENT
Start: 2020-11-05 | End: 2020-11-06 | Stop reason: HOSPADM

## 2020-11-05 RX ORDER — FENTANYL CITRATE 50 UG/ML
50 INJECTION, SOLUTION INTRAMUSCULAR; INTRAVENOUS
Status: DISCONTINUED | OUTPATIENT
Start: 2020-11-05 | End: 2020-11-05 | Stop reason: HOSPADM

## 2020-11-05 RX ORDER — NEOSTIGMINE METHYLSULFATE 1 MG/ML
INJECTION, SOLUTION INTRAVENOUS AS NEEDED
Status: DISCONTINUED | OUTPATIENT
Start: 2020-11-05 | End: 2020-11-05 | Stop reason: SURG

## 2020-11-05 RX ORDER — SODIUM CHLORIDE 0.9 % (FLUSH) 0.9 %
10 SYRINGE (ML) INJECTION EVERY 12 HOURS SCHEDULED
Status: DISCONTINUED | OUTPATIENT
Start: 2020-11-05 | End: 2020-11-05 | Stop reason: HOSPADM

## 2020-11-05 RX ORDER — AMLODIPINE BESYLATE 5 MG/1
5 TABLET ORAL NIGHTLY
Status: DISCONTINUED | OUTPATIENT
Start: 2020-11-05 | End: 2020-11-06 | Stop reason: HOSPADM

## 2020-11-05 RX ORDER — GLYCOPYRROLATE 0.2 MG/ML
INJECTION INTRAMUSCULAR; INTRAVENOUS AS NEEDED
Status: DISCONTINUED | OUTPATIENT
Start: 2020-11-05 | End: 2020-11-05 | Stop reason: SURG

## 2020-11-05 RX ORDER — SODIUM CHLORIDE 9 MG/ML
INJECTION, SOLUTION INTRAVENOUS AS NEEDED
Status: DISCONTINUED | OUTPATIENT
Start: 2020-11-05 | End: 2020-11-05 | Stop reason: HOSPADM

## 2020-11-05 RX ORDER — FAMOTIDINE 20 MG/1
20 TABLET, FILM COATED ORAL ONCE
Status: COMPLETED | OUTPATIENT
Start: 2020-11-05 | End: 2020-11-05

## 2020-11-05 RX ORDER — VANCOMYCIN HYDROCHLORIDE 1 G/200ML
15 INJECTION, SOLUTION INTRAVENOUS ONCE
Status: COMPLETED | OUTPATIENT
Start: 2020-11-05 | End: 2020-11-05

## 2020-11-05 RX ORDER — LIDOCAINE HYDROCHLORIDE 10 MG/ML
INJECTION, SOLUTION INFILTRATION; PERINEURAL AS NEEDED
Status: DISCONTINUED | OUTPATIENT
Start: 2020-11-05 | End: 2020-11-05 | Stop reason: HOSPADM

## 2020-11-05 RX ORDER — HEPARIN SODIUM 1000 [USP'U]/ML
INJECTION, SOLUTION INTRAVENOUS; SUBCUTANEOUS AS NEEDED
Status: DISCONTINUED | OUTPATIENT
Start: 2020-11-05 | End: 2020-11-05 | Stop reason: SURG

## 2020-11-05 RX ORDER — ONDANSETRON 2 MG/ML
INJECTION INTRAMUSCULAR; INTRAVENOUS AS NEEDED
Status: DISCONTINUED | OUTPATIENT
Start: 2020-11-05 | End: 2020-11-05 | Stop reason: SURG

## 2020-11-05 RX ORDER — SODIUM CHLORIDE, SODIUM LACTATE, POTASSIUM CHLORIDE, CALCIUM CHLORIDE 600; 310; 30; 20 MG/100ML; MG/100ML; MG/100ML; MG/100ML
9 INJECTION, SOLUTION INTRAVENOUS CONTINUOUS PRN
Status: DISCONTINUED | OUTPATIENT
Start: 2020-11-05 | End: 2020-11-06 | Stop reason: HOSPADM

## 2020-11-05 RX ORDER — FENTANYL CITRATE 50 UG/ML
INJECTION, SOLUTION INTRAMUSCULAR; INTRAVENOUS AS NEEDED
Status: DISCONTINUED | OUTPATIENT
Start: 2020-11-05 | End: 2020-11-05 | Stop reason: SURG

## 2020-11-05 RX ORDER — HYDRALAZINE HYDROCHLORIDE 25 MG/1
25 TABLET, FILM COATED ORAL EVERY 8 HOURS SCHEDULED
Status: DISCONTINUED | OUTPATIENT
Start: 2020-11-05 | End: 2020-11-06 | Stop reason: HOSPADM

## 2020-11-05 RX ORDER — ASPIRIN 81 MG/1
81 TABLET ORAL DAILY
Status: DISCONTINUED | OUTPATIENT
Start: 2020-11-05 | End: 2020-11-06 | Stop reason: HOSPADM

## 2020-11-05 RX ORDER — CHLORHEXIDINE GLUCONATE 500 MG/1
1 CLOTH TOPICAL EVERY 12 HOURS PRN
Status: DISCONTINUED | OUTPATIENT
Start: 2020-11-05 | End: 2020-11-05

## 2020-11-05 RX ORDER — HYDROCODONE BITARTRATE AND ACETAMINOPHEN 7.5; 325 MG/1; MG/1
1 TABLET ORAL EVERY 6 HOURS PRN
Status: DISCONTINUED | OUTPATIENT
Start: 2020-11-05 | End: 2020-11-06 | Stop reason: HOSPADM

## 2020-11-05 RX ADMIN — ROCURONIUM BROMIDE 50 MG: 10 INJECTION INTRAVENOUS at 12:51

## 2020-11-05 RX ADMIN — DEXAMETHASONE SODIUM PHOSPHATE 8 MG: 4 INJECTION, SOLUTION INTRAMUSCULAR; INTRAVENOUS at 12:56

## 2020-11-05 RX ADMIN — LIDOCAINE HYDROCHLORIDE 0.4 ML: 10 INJECTION, SOLUTION EPIDURAL; INFILTRATION; INTRACAUDAL; PERINEURAL at 10:42

## 2020-11-05 RX ADMIN — SODIUM CHLORIDE 30 ML/HR: 4.5 INJECTION, SOLUTION INTRAVENOUS at 15:43

## 2020-11-05 RX ADMIN — SODIUM CHLORIDE, POTASSIUM CHLORIDE, SODIUM LACTATE AND CALCIUM CHLORIDE 9 ML/HR: 600; 310; 30; 20 INJECTION, SOLUTION INTRAVENOUS at 10:50

## 2020-11-05 RX ADMIN — FAMOTIDINE 20 MG: 20 TABLET, FILM COATED ORAL at 10:42

## 2020-11-05 RX ADMIN — ROCURONIUM BROMIDE 10 MG: 10 INJECTION INTRAVENOUS at 13:03

## 2020-11-05 RX ADMIN — ATORVASTATIN CALCIUM 10 MG: 10 TABLET, FILM COATED ORAL at 20:34

## 2020-11-05 RX ADMIN — SODIUM CHLORIDE, POTASSIUM CHLORIDE, SODIUM LACTATE AND CALCIUM CHLORIDE 9 ML/HR: 600; 310; 30; 20 INJECTION, SOLUTION INTRAVENOUS at 10:42

## 2020-11-05 RX ADMIN — HYDROCODONE BITARTRATE AND ACETAMINOPHEN 1 TABLET: 7.5; 325 TABLET ORAL at 20:34

## 2020-11-05 RX ADMIN — NEOSTIGMINE 3 MG: 1 INJECTION INTRAVENOUS at 13:53

## 2020-11-05 RX ADMIN — NICARDIPINE HYDROCHLORIDE 5 MG/HR: 0.1 INJECTION, SOLUTION INTRAVENOUS at 14:11

## 2020-11-05 RX ADMIN — FENTANYL CITRATE 100 MCG: 50 INJECTION, SOLUTION INTRAMUSCULAR; INTRAVENOUS at 12:51

## 2020-11-05 RX ADMIN — GLYCOPYRROLATE 0.4 MG: 0.2 INJECTION INTRAMUSCULAR; INTRAVENOUS at 13:53

## 2020-11-05 RX ADMIN — HEPARIN SODIUM 5000 UNITS: 1000 INJECTION, SOLUTION INTRAVENOUS; SUBCUTANEOUS at 13:03

## 2020-11-05 RX ADMIN — LIDOCAINE HYDROCHLORIDE 0.4 ML: 10 INJECTION, SOLUTION EPIDURAL; INFILTRATION; INTRACAUDAL; PERINEURAL at 10:49

## 2020-11-05 RX ADMIN — ASPIRIN 81 MG: 81 TABLET, COATED ORAL at 18:19

## 2020-11-05 RX ADMIN — NICARDIPINE HYDROCHLORIDE 12.5 MG/HR: 0.1 INJECTION, SOLUTION INTRAVENOUS at 15:05

## 2020-11-05 RX ADMIN — PROPOFOL 200 MG: 10 INJECTION, EMULSION INTRAVENOUS at 12:51

## 2020-11-05 RX ADMIN — NICARDIPINE HYDROCHLORIDE 5 MG/HR: 0.1 INJECTION, SOLUTION INTRAVENOUS at 13:35

## 2020-11-05 RX ADMIN — ONDANSETRON 4 MG: 2 INJECTION INTRAMUSCULAR; INTRAVENOUS at 13:42

## 2020-11-05 RX ADMIN — VANCOMYCIN HYDROCHLORIDE 1000 MG: 1 INJECTION, SOLUTION INTRAVENOUS at 10:59

## 2020-11-05 RX ADMIN — PHENYLEPHRINE HYDROCHLORIDE 1 MCG/KG/MIN: 10 INJECTION INTRAVENOUS at 13:03

## 2020-11-05 NOTE — BRIEF OP NOTE
CAROTID ENDARTERECTOMY WITH EEG  Progress Note    Delia Rodriguez  11/5/2020    Pre-op Diagnosis:   Stenosis of left carotid artery [I65.22]       Post-Op Diagnosis Codes:     * Stenosis of left carotid artery [I65.22]    Procedure/CPT® Codes:    Procedure(s):  LEFT CAROTID ENDARTERECTOMY WITH EEG    Surgeon(s):  Gerardo Paige MD    Anesthesia: General    Staff:   Circulator: Megan Alvarez RN  Scrub Person: Ester Keen; Karri Burrell  Nursing Assistant: Augustin Dailey PCT  Assistant: Diaz Alvarez PA  Assistant: Diaz Alvarez PA      Estimated Blood Loss: minimal    Urine Voided: * No values recorded between 11/5/2020 12:29 PM and 11/5/2020  1:53 PM *    Specimens:                Specimens     ID Source Type Tests Collected By Collected At Frozen?      A Carotid Plaque Tissue · TISSUE PATHOLOGY EXAM   Gerardo Paige MD 11/5/20 1304 No     Description: LEFT CAROTID PLAQUE    This specimen was not marked as sent.                Drains: * No LDAs found *    Complications: None    Assistant: Diaz Alvarez PA  was responsible for performing the following activities: Retraction, Suction, Closing and Placing Dressing and their skilled assistance was necessary for the success of this case.    Gerardo Paige MD     Date: 11/5/2020  Time: 14:06 EST

## 2020-11-05 NOTE — ANESTHESIA PROCEDURE NOTES
Airway  Urgency: elective    Date/Time: 11/5/2020 12:52 PM  Airway not difficult    General Information and Staff    Patient location during procedure: OR  CRNA: Bryan Zaman CRNA    Indications and Patient Condition  Indications for airway management: airway protection    Preoxygenated: yes  MILS not maintained throughout  Mask difficulty assessment: 1 - vent by mask    Final Airway Details  Final airway type: endotracheal airway      Successful airway: ETT  Cuffed: yes   Successful intubation technique: direct laryngoscopy  Facilitating devices/methods: intubating stylet  Endotracheal tube insertion site: oral  Blade: Jimmy  Blade size: 3  ETT size (mm): 7.5  Cormack-Lehane Classification: grade I - full view of glottis  Placement verified by: chest auscultation and capnometry   Measured from: lips  ETT/EBT  to lips (cm): 20  Number of attempts at approach: 1  Assessment: lips, teeth, and gum same as pre-op and atraumatic intubation    Additional Comments  Negative epigastric sounds, Breath sound equal bilaterally with symmetric chest rise and fall

## 2020-11-05 NOTE — H&P
Pre-Op H&P  Delia Rodriguez  [id number]  1945      Chief complaint: Left carotid stenosis      Subjective:  Patient is a 75 y.o.female presents for scheduled surgery by Dr. Paige.  She anticipates a left carotid endartectomy today.     Per office note 10/20/2020: (((Delia Rodriguez is a 75 y.o. female with a history of hyperlipidemia, hypertension, prediabetes and left carotid stenosis.  She presents today for review of her CTA of the carotids and MRI brain as well as discussion of left carotid endarterectomy.   MRI of the brain 9/8/2020 with age-appropriate cerebral atrophy and no areas of ischemia.  CTA of the carotids revealed no significant stenosis stenosis in the right carotid artery and high-grade 75% stenosis of the proximal left internal carotid artery.  Patient with history of perioral numbness.  She denies any new TIA/CVA symptoms.  Patient's father had occluded carotid artery and her mother had a significant carotid artery stenosis that was stented with periprocedural stroke.  Patient has had uncontrolled blood pressures prior to her dental procedure recently in which it was canceled. Elevated blood pressure in office today of 176/101.  She has not followed up with her primary care provider as advised by her dentist for evaluation.  Advised patient to call primary care provider and make appointment for uncontrolled blood pressure and her  was able to make appointment for Thursday, during office visit, for evaluation prior to surgery.  Will have patient return for left carotid endarterectomy with EEG.  The risk and benefits of surgery were discussed with the patient/family including pain, bleeding, infection, stroke, hoarseness, dysphagia, myocardial infarction and death.  Patient understands these risks and wishes to proceed.)))    Review of Systems:  Constitutiona-- No fever, chills or sweats. No fatigue.  CV-- No chest pain, palpitation or syncope  Resp-- No SOB, cough,  "hemoptysis  GI- No nausea, vomiting, or diarrhea.    -- No dysuria, hematuria, or flank pain.  No hesitancy, urgency or flank pain.  MS-- No acute swelling or pain in the bones or joints of arms/legs. No new back pain.  Skin--No rashes or lesions      Allergies:   Allergies   Allergen Reactions   • Penicillins Hives     Latex: No  Contrast Dye: No      Home Meds:  Medications Prior to Admission   Medication Sig Dispense Refill Last Dose   • amLODIPine (NORVASC) 5 MG tablet Take 5 mg by mouth Daily.   11/4/2020 at 2000   • aspirin 81 MG EC tablet Take 81 mg by mouth Daily.   Past Week at Unknown time   • hydrALAZINE (APRESOLINE) 25 MG tablet Take 25 mg by mouth 3 (Three) Times a Day.   11/5/2020 at 0800   • lisinopril (PRINIVIL,ZESTRIL) 10 MG tablet Take 10 mg by mouth Daily.   11/4/2020 at 2000   • simvastatin (ZOCOR) 10 MG tablet Take 10 mg by mouth Every Other Day.   11/2/2020 at 2000         PMH:   Past Medical History:   Diagnosis Date   • Arthritis    • Carotid stenosis    • GERD (gastroesophageal reflux disease)    • Hyperlipidemia    • Hypertension    • IBS (irritable bowel syndrome)    • Pre-diabetes      PSH:    Past Surgical History:   Procedure Laterality Date   • BREAST SURGERY      right breast biopsy   • CATARACT EXTRACTION, BILATERAL     • COLONOSCOPY     • ENDOSCOPY     • GALLBLADDER SURGERY     • HYSTERECTOMY         Social History:   Tobacco:   Social History     Tobacco Use   Smoking Status Never Smoker   Smokeless Tobacco Never Used      Alcohol:     Social History     Substance and Sexual Activity   Alcohol Use Not Currently   • Frequency: Never         Physical Exam:/79 (BP Location: Right arm, Patient Position: Lying)   Pulse 101   Temp 97.3 °F (36.3 °C) (Tympanic)   Resp 16   Ht 166.4 cm (65.5\")   Wt 65.8 kg (145 lb)   SpO2 99%   BMI 23.76 kg/m²       General Appearance:    Alert, cooperative, no distress, appears stated age   Head:    Normocephalic, without obvious " abnormality, atraumatic   Lungs:     Clear to auscultation bilaterally, respirations unlabored    Heart: Regular rate and rhythm, S1 and S2 normal, no murmur, rub    or gallop    Abdomen:    Soft without tenderness   Breast Exam:    deferred   Genitalia:    deferred   Extremities:   Extremities normal, atraumatic, no cyanosis or edema   Skin:   Skin color, texture, turgor normal, no rashes or lesions   Neurologic:   Grossly intact     Results Review:     LABS:  Lab Results   Component Value Date    WBC 10.91 (H) 11/02/2020    HGB 15.2 11/02/2020    HCT 49.3 (H) 11/02/2020    MCV 88.7 11/02/2020     11/02/2020    GLUCOSE 152 (H) 11/02/2020    BUN 15 11/02/2020    CREATININE 0.64 11/02/2020    EGFRIFNONA 90 11/02/2020     11/02/2020    K 4.0 11/02/2020     11/02/2020    CO2 26.0 11/02/2020    CALCIUM 9.7 11/02/2020     ECG 12 Lead  Order: [id number]  Status:  Final result   Visible to patient:  No (not released) Next appt:  None Dx:  Stenosis of left carotid artery  Component   Ref Range & Units 11/2/20 1642   QT Interval   ms 360    QTC Interval   ms 423       Narrative & Impression    Test Reason : PAT, HTN  Blood Pressure : **/** mmHG  Vent. Rate : 083 BPM     Atrial Rate : 083 BPM     P-R Int : 148 ms          QRS Dur : 074 ms      QT Int : 360 ms       P-R-T Axes : 056 -30 013 degrees     QTc Int : 423 ms     Normal sinus rhythm  Left axis deviation  Nonspecific ST abnormality  Abnormal ECG  No previous ECGs available  Confirmed by MD Jordan Robert ([id number]) on 11/3/2020 7:39:23 AM           SARS-CoV-2 HUYEN   Not Detected Not Detected        RADIOLOGY:  Study Result    EXAMINATION: XR CHEST, PA AND LATERAL-11/02/2020:      INDICATION: Pre-Op Cardiac Surgery; I65.22-Occlusion and stenosis of  left carotid artery.      COMPARISON: NONE.     FINDINGS: PA and lateral views of the chest reveal cardiac and  mediastinal silhouettes within normal limits. Lung fields are clear. No  focal parenchymal  opacification present.  No pleural effusion or  pneumothorax. The bony structures are unremarkable. Pulmonary  vascularity is within normal limits. Multilevel degenerative changes  seen throughout the spine.     IMPRESSION:  No acute cardiopulmonary disease     Study Result    CT ANGIOGRAM CAROTIDS     REASON FOR EXAM: Carotid stenosis, known or suspected; I65.22-Occlusion  and stenosis of left carotid artery.      Today's study is compared with a Doppler ultrasound exam from  07/28/2020.      Contrast was injected in a bolus fashion time for maximum arterial  opacification. Spiral scans were obtained from the aortic arch and  extended to the base of the skull.     CT ANGIOGRAPHIC FINDINGS:  There was normal branching of the great  vessels from the aortic arch. There was fairly marked tortuosity in the  common carotid arteries at the base of the neck. There was no stenosis  in the common carotid arteries. At the carotid bifurcations there was  calcific plaque bilaterally. On the left there was a short segment of  high-grade stenosis of the proximal internal carotid artery. The  diameter of the stenosis is in the 75% range. There was plaque on the  right but no significant stenosis. There was flow in the internal  carotid arteries to the base of the skull.     IMPRESSION:  Atherosclerotic changes were noted involving the carotid  systems. The common carotid arteries are markedly tortuous at the base  of the neck. At the bifurcations there was plaque involving both carotid  systems. There was a short segment of high grade stenosis involving the  internal carotid artery on the left.        I reviewed the patient's new clinical results.    Cancer Staging (if applicable)  Cancer Patient: __ yes __no __unknown; If yes, clinical stage T:__ N:__M:__, stage group or __N/A      Impression: Stenosis of left carotid artery      Plan: Left CAROTID ENDARTERECTOMY WITH EEG      JUVENCIO Molina   11/5/2020   11:06 EST

## 2020-11-05 NOTE — PLAN OF CARE
Goal Outcome Evaluation:  Plan of Care Reviewed With: patient     Outcome Summary: Patient admitted to ICU today following procedure (carotid endarterectomy). Vitals and neurological status stable. Remains on small dose of cardene to keep SBP below ordered parameters. Pain controlled at this time and patient is refusing medication.

## 2020-11-05 NOTE — PROGRESS NOTES
Pulmonary/Critical Care Follow-up     LOS: 0 days   Patient Care Team:  Dustin Cruz MD as PCP - General (Internal Medicine)    Chief Complaint:post op carotid, h/o HTN & preDM   Subjective     HPI:  Delia Rodriguez is a 75 y.o. female, non-smoker, with PMH significant for HTN, HL, and prediabetes.  In June she experienced a transient episode of right perioral numbness which resolved after 1 hour.  She had a second episode in July.  Both of these were without vision loss focal weakness or speaking difficulties.  On 7/28/2020 carotid ultrasound was obtained which showed atherosclerotic plaques bilaterally, 50% on right, high-grade with turbulent flow on left (75%).  She was referred to cardiothoracic surgery who ordered a CTA which was obtained on 9/11/2020.  CTA results showed torturous common carotid arteries at the base of the neck as well as plaque at bilateral bifurcations, and high-grade stenosis in the left ICA.  An MRI of the brain was obtained on 9/8/2020 with no evidence of ischemia noted.  CT surgery recommended left CEA.    Interval History:   Patient comes to ICU postprocedure.      History taken from:     PMH/FH/Social History were reviewed and updated appropriately in the electronic medical record.     Review of Systems:    Review of 14 systems was completed with positives and pertinent negatives noted in the subjective section.  All other systems reviewed and are negative.         Objective     Vital Signs     No intake/output data recorded.  There is no height or weight on file to calculate BMI.     IV drips:     Physical Exam:    General Appearance:  Alert, in no acute distress  Eyes:  No scleral icterus or pallor, pupils normal  Ears, Nose, Mouth, Throat:  Atraumatic, oropharynx clear  Neck:  Trachea midline, thyroid normal  Respiratory:  Clear to auscultation bilaterally, normal effort  Cardiovascular:  Regular rate and rhythm, no murmurs, no peripheral edema  Gastrointestinal:  Soft,  non-tender, non-distended, no hepatosplenomegaly  Skin:  Normal temperature, no rash  Psychiatric:  Normal mood and affect, normal judgement and insight  Neuro:  No new focal neurologic deficits observed      Results Review:     I reviewed the patient's new clinical results.   Results from last 7 days   Lab Units 11/02/20  1620   SODIUM mmol/L 141   POTASSIUM mmol/L 4.0   CHLORIDE mmol/L 102   CO2 mmol/L 26.0   BUN mg/dL 15   CREATININE mg/dL 0.64   CALCIUM mg/dL 9.7   GLUCOSE mg/dL 152*     Results from last 7 days   Lab Units 11/02/20  1620   WBC 10*3/mm3 10.91*   HEMOGLOBIN g/dL 15.2   HEMATOCRIT % 49.3*   PLATELETS 10*3/mm3 442               I reviewed the patient's new imaging including images and reports.    Medication Review:     No current facility-administered medications for this encounter.       Assessment/Plan         Stenosis of left carotid artery    Hypertension    Pre-diabetes    Hyperlipidemia    76 y/o WF w/ h/o HTN, Pre-DM A1c 5.8, HLD, LICA stenosis, recent TIA, admitted 11/5/20 post op Left CEA by Dr. Paige.    Post op care per CTS    HTN - BP control, wean cardene as able    Pre-DM - Glycemic control    Pain - Titrate pain meds and narcotics as needed    Resume / continue appropriate home meds      JUVENCIO Bran  11/05/20  07:54 EST    I have seen and examined the patient, performing a face-to-face diagnostic evaluation with plan of care reviewed and developed with APRN and nursing staff. I have addended and modified the above history of present illness, physical examination, and assessment and plan to reflect my findings and impressions.    Maury Dee MD  Pulmonology and Critical Care Medicine  11/05/20 18:18 EST  Electronically Signed

## 2020-11-05 NOTE — ANESTHESIA PREPROCEDURE EVALUATION
Anesthesia Evaluation     Patient summary reviewed   no history of anesthetic complications:  NPO Solid Status: > 8 hours  NPO Liquid Status: > 8 hours           Airway   Mallampati: II  Neck ROM: full  No difficulty expected  Dental      Pulmonary - normal exam   (-) not a smoker  Cardiovascular - normal exam    ECG reviewed    (+) hypertension 2 medications or greater, hyperlipidemia,  carotid artery disease    ROS comment: 11/2/20-Normal sinus rhythm  Left axis deviation  Nonspecific ST abnormality  Abnormal ECG 11/2/20 cxr- wnl. 07/28/20 -tte- · LVEF 66 - 70%. Gr1dd, trace tr, pr, no effusions.     Neuro/Psych- negative ROS  GI/Hepatic/Renal/Endo    (+)  GERD,      Musculoskeletal     Abdominal    Substance History - negative use     OB/GYN          Other   arthritis,      ROS/Med Hx Other: hgb 15.2 plt 442 k 4.0 inr 0.98 gluc 152;   Carotid duplex: less than 50% stenosis on the right. MRI brain 9/8/2020 with age-appropriate cerebral atrophy and no areas of ischemia.  CTA  carotids no significant stenosis in R carotid artery and high-grade 75% stenosis of the proximal left internal carotid artery.  + history of perioral numbness.  She denies any new TIA/CVA symptoms. Clinic /101                Anesthesia Plan    ASA 2     general   (Judy)  intravenous induction     Anesthetic plan, all risks, benefits, and alternatives have been provided, discussed and informed consent has been obtained with: patient.

## 2020-11-05 NOTE — ANESTHESIA POSTPROCEDURE EVALUATION
"Patient: Delia Rodriguez    Procedure Summary     Date: 11/05/20 Room / Location:  [facility] /  [facility]    Anesthesia Start: 1232 Anesthesia Stop:     Procedure: LEFT CAROTID ENDARTERECTOMY WITH EEG (Left Neck) Diagnosis:       Stenosis of left carotid artery      (Stenosis of left carotid artery [I65.22])    Surgeon: Gerardo Paige MD Provider:     Anesthesia Type: general ASA Status: 2          Anesthesia Type: general    Vitals  Vitals Value Taken Time   /72 11/05/20 1413   Temp     Pulse 93 11/05/20 1415   Resp     SpO2 95 % 11/05/20 1415   Vitals shown include unvalidated device data.        Post Anesthesia Care and Evaluation    Patient location during evaluation: PACU  Patient participation: complete - patient participated  Level of consciousness: awake and responsive to verbal stimuli  Pain score: 2  Pain management: adequate  Airway patency: patent  Anesthetic complications: No anesthetic complications    Cardiovascular status: acceptable  Respiratory status: acceptable  Hydration status: acceptable    Comments: Pt awake and responsive. SV. VSS. Report to RN. Patient Vitals in the past 24 hrs:  11/05/20 1051, BP:165/79, Temp:97.3 °F (36.3 °C), Temp src:Tympanic, Pulse:101, Resp:16, SpO2:99 %, Height:166.4 cm (65.5\"), Weight:65.8 kg (145 lb)  133/78. p 72. r 16. t 98.1                  "

## 2020-11-06 VITALS
HEIGHT: 66 IN | BODY MASS INDEX: 23.03 KG/M2 | TEMPERATURE: 97.6 F | WEIGHT: 143.3 LBS | OXYGEN SATURATION: 94 % | HEART RATE: 70 BPM | RESPIRATION RATE: 16 BRPM | DIASTOLIC BLOOD PRESSURE: 59 MMHG | SYSTOLIC BLOOD PRESSURE: 116 MMHG

## 2020-11-06 LAB
ANION GAP SERPL CALCULATED.3IONS-SCNC: 12 MMOL/L (ref 5–15)
BASOPHILS # BLD AUTO: 0.01 10*3/MM3 (ref 0–0.2)
BASOPHILS NFR BLD AUTO: 0.1 % (ref 0–1.5)
BUN SERPL-MCNC: 11 MG/DL (ref 8–23)
BUN/CREAT SERPL: 22.9 (ref 7–25)
CALCIUM SPEC-SCNC: 7.1 MG/DL (ref 8.6–10.5)
CHLORIDE SERPL-SCNC: 113 MMOL/L (ref 98–107)
CO2 SERPL-SCNC: 17 MMOL/L (ref 22–29)
CREAT SERPL-MCNC: 0.48 MG/DL (ref 0.57–1)
DEPRECATED RDW RBC AUTO: 48.5 FL (ref 37–54)
EOSINOPHIL # BLD AUTO: 0.18 10*3/MM3 (ref 0–0.4)
EOSINOPHIL NFR BLD AUTO: 2.1 % (ref 0.3–6.2)
ERYTHROCYTE [DISTWIDTH] IN BLOOD BY AUTOMATED COUNT: 14.6 % (ref 12.3–15.4)
GFR SERPL CREATININE-BSD FRML MDRD: 125 ML/MIN/1.73
GLUCOSE BLDC GLUCOMTR-MCNC: 159 MG/DL (ref 70–130)
GLUCOSE SERPL-MCNC: 188 MG/DL (ref 65–99)
HCT VFR BLD AUTO: 39.4 % (ref 34–46.6)
HGB BLD-MCNC: 12.3 G/DL (ref 12–15.9)
IMM GRANULOCYTES # BLD AUTO: 0.03 10*3/MM3 (ref 0–0.05)
IMM GRANULOCYTES NFR BLD AUTO: 0.4 % (ref 0–0.5)
LYMPHOCYTES # BLD AUTO: 1.09 10*3/MM3 (ref 0.7–3.1)
LYMPHOCYTES NFR BLD AUTO: 12.9 % (ref 19.6–45.3)
MCH RBC QN AUTO: 28.2 PG (ref 26.6–33)
MCHC RBC AUTO-ENTMCNC: 31.2 G/DL (ref 31.5–35.7)
MCV RBC AUTO: 90.4 FL (ref 79–97)
MONOCYTES # BLD AUTO: 0.1 10*3/MM3 (ref 0.1–0.9)
MONOCYTES NFR BLD AUTO: 1.2 % (ref 5–12)
NEUTROPHILS NFR BLD AUTO: 7.07 10*3/MM3 (ref 1.7–7)
NEUTROPHILS NFR BLD AUTO: 83.3 % (ref 42.7–76)
NRBC BLD AUTO-RTO: 0 /100 WBC (ref 0–0.2)
PLAT MORPH BLD: NORMAL
PLATELET # BLD AUTO: 343 10*3/MM3 (ref 140–450)
PMV BLD AUTO: 9.8 FL (ref 6–12)
POTASSIUM SERPL-SCNC: 3.2 MMOL/L (ref 3.5–5.2)
RBC # BLD AUTO: 4.36 10*6/MM3 (ref 3.77–5.28)
RBC MORPH BLD: NORMAL
SODIUM SERPL-SCNC: 142 MMOL/L (ref 136–145)
WBC # BLD AUTO: 8.48 10*3/MM3 (ref 3.4–10.8)
WBC MORPH BLD: NORMAL

## 2020-11-06 PROCEDURE — 85007 BL SMEAR W/DIFF WBC COUNT: CPT | Performed by: PHYSICIAN ASSISTANT

## 2020-11-06 PROCEDURE — 85025 COMPLETE CBC W/AUTO DIFF WBC: CPT | Performed by: PHYSICIAN ASSISTANT

## 2020-11-06 PROCEDURE — 25010000002 VANCOMYCIN PER 500 MG: Performed by: PHYSICIAN ASSISTANT

## 2020-11-06 PROCEDURE — 82962 GLUCOSE BLOOD TEST: CPT

## 2020-11-06 PROCEDURE — 80048 BASIC METABOLIC PNL TOTAL CA: CPT | Performed by: PHYSICIAN ASSISTANT

## 2020-11-06 RX ORDER — POTASSIUM CHLORIDE 7.45 MG/ML
10 INJECTION INTRAVENOUS
Status: DISCONTINUED | OUTPATIENT
Start: 2020-11-06 | End: 2020-11-06 | Stop reason: HOSPADM

## 2020-11-06 RX ORDER — HYDROCODONE BITARTRATE AND ACETAMINOPHEN 7.5; 325 MG/1; MG/1
1 TABLET ORAL EVERY 6 HOURS PRN
Start: 2020-11-06 | End: 2020-11-12

## 2020-11-06 RX ORDER — POTASSIUM CHLORIDE 750 MG/1
40 CAPSULE, EXTENDED RELEASE ORAL AS NEEDED
Status: DISCONTINUED | OUTPATIENT
Start: 2020-11-06 | End: 2020-11-06 | Stop reason: HOSPADM

## 2020-11-06 RX ORDER — CLOPIDOGREL BISULFATE 75 MG/1
75 TABLET ORAL DAILY
Qty: 30 TABLET | Refills: 3 | Status: SHIPPED | OUTPATIENT
Start: 2020-11-06

## 2020-11-06 RX ORDER — POTASSIUM CHLORIDE 1.5 G/1.77G
40 POWDER, FOR SOLUTION ORAL AS NEEDED
Status: DISCONTINUED | OUTPATIENT
Start: 2020-11-06 | End: 2020-11-06 | Stop reason: HOSPADM

## 2020-11-06 RX ADMIN — HYDROCODONE BITARTRATE AND ACETAMINOPHEN 1 TABLET: 7.5; 325 TABLET ORAL at 04:22

## 2020-11-06 RX ADMIN — NICARDIPINE HYDROCHLORIDE 5 MG/HR: 0.1 INJECTION, SOLUTION INTRAVENOUS at 01:55

## 2020-11-06 RX ADMIN — SODIUM CHLORIDE, PRESERVATIVE FREE 3 ML: 5 INJECTION INTRAVENOUS at 00:08

## 2020-11-06 RX ADMIN — CLOPIDOGREL BISULFATE 75 MG: 75 TABLET ORAL at 08:34

## 2020-11-06 RX ADMIN — LISINOPRIL 10 MG: 10 TABLET ORAL at 00:08

## 2020-11-06 RX ADMIN — SODIUM CHLORIDE, PRESERVATIVE FREE 3 ML: 5 INJECTION INTRAVENOUS at 08:35

## 2020-11-06 RX ADMIN — HYDRALAZINE HYDROCHLORIDE 25 MG: 25 TABLET, FILM COATED ORAL at 06:57

## 2020-11-06 RX ADMIN — ASPIRIN 81 MG: 81 TABLET, COATED ORAL at 08:34

## 2020-11-06 RX ADMIN — POTASSIUM CHLORIDE 40 MEQ: 10 CAPSULE, COATED, EXTENDED RELEASE ORAL at 10:03

## 2020-11-06 RX ADMIN — VANCOMYCIN HYDROCHLORIDE 1000 MG: 1 INJECTION, SOLUTION INTRAVENOUS at 00:09

## 2020-11-06 NOTE — PLAN OF CARE
Goal Outcome Evaluation:  Plan of Care Reviewed With: patient  Progress: improving  Outcome Summary: Pt did well overnight, no major issues. VERITO, Cardene off this am. Dr Paige in to see pt and pt may go home later today of she is able to walk and does well. Tolerating po well. voids per bedpan.

## 2020-11-06 NOTE — PLAN OF CARE
Goal Outcome Evaluation:  Plan of Care Reviewed With: patient, spouse  Progress: improving  Outcome Summary: patient adequate for discharge per MD, A line d/c's and CURLY d/c'd. patient ambulated halls with staff and no signs of distress. will replace potassium and then send home.

## 2020-11-06 NOTE — OP NOTE
DATE OF PROCEDURE: 11/5/2020     PREOPERATIVE DIAGNOSES:  1. Left carotid artery stenosis  2. Hypertension  3. Hyperlipidemia  4. Prediabetes     POSTOPERATIVE DIAGNOSES:    1. Left carotid artery stenosis  2. Hypertension  3. Hyperlipidemia  4. Prediabetes     PROCEDURES PERFORMED:    1. Left carotid endarterectomy with patch angioplasty and EEG monitoring    SURGEON: Gerardo Paige MD       ASSISTANTS:    1.Diaz Alvarez PA was responsible for performing the following activities: Retraction, Suction, Closing and Placing Dressing and their skilled assistance was necessary for the success of this case.    Circulator: Megan Alvarez RN  Scrub Person: Ester Keen; Karri Burrell  Nursing Assistant: Augustin Dailey PCT    ANESTHESIA: General endotracheal anesthesia with Bryan Zaman CRNA     ESTIMATED BLOOD LOSS: Less than 50 mL.       Carotid clamp time: 27 minutes    INDICATIONS:  75-year-old  female with a history of hypertension, hyperlipidemia and prediabetes who presents with perioral numbness. The patient was felt to be a reasonable candidate for left carotid endarterectomy. The risks and benefits of surgery were discussed with the patient including pain, bleeding, infection, stroke, dysphagia, hoarseness, myocardial infarction and death. The patient understood these risks and wished to proceed with surgery.      DESCRIPTION OF PROCEDURE: The patient was taken to the operating room and placed under general endotracheal anesthesia. She was prepped and draped in the usual sterile fashion and a timeout was performed including the patient's name, procedure and antibiotic administration.  An incision was made along the anterior border of the left sternocleidomastoid.  Electrocautery was utilized to dissect down to the carotid sheath.  The common, external and internal carotid arteries were circumferentially dissected sharply and encircled with vessel loops.  The vagus and hypoglossal nerves were  identified and carefully avoided during surgery.  5000 units of heparin were administered systemically.  A clamp was then placed on the internal carotid artery with no EEG changes at 2 minutes.  Clamps were also applied to the common and external carotid arteries.  A longitudinal arteriotomy was performed from the common to internal carotid artery.  A hard near obstructive plaque was encountered that extended from the common to the internal and external carotid arteries.  Dissection was carried up the internal carotid artery to include the proximal stenosis seen on CT.  This was freed from the vessel wall using a Penfield and sent for permanent pathology.  Two 7-0 Prolene sutures were utilized to secure the posterior intima and prevent an intimal flap.  Smooth proximal and distal transitions were achieved.  A bovine pericardial patch was fashioned and secured using a running 6-0 Prolene suture.  Prior to tying down this suture, the internal carotid artery was flashed and clamp reapplied.  The common carotid artery was also flashed and clamp reapplied.  The prolene suture was then tied down and clamps to the external and common carotid artery were removed.  After several seconds, the clamp to the internal carotid artery was removed and hemostasis was achieved. A 15 Nepali drain was placed within the wound bed and secured using a 0 silk suture.  The sternocleidomastoid was reapproximated with a running 3-0 Vicryl suture.  The platysma was reapproximated with a running 3-0 Vicryl suture followed by a 4-0 Monocryl subcuticular stitch.  Overlying skin glue was applied to this incision and gauze and tape to the drain site.  The patient was extubated in the operating room and moving all 4 extremities prior to transport to the recovery room in stable condition.

## 2020-11-06 NOTE — PROGRESS NOTES
Cardiothoracic Surgery Progress Note      POD # 1 s/p Left CEA     LOS: 1 day      Subjective:  No complaints.  Denies dysphagia    Objective:  Vital Signs  Temp:  [97.2 °F (36.2 °C)-98 °F (36.7 °C)] 97.8 °F (36.6 °C)  Heart Rate:  [] 73  Resp:  [16-20] 18  BP: ()/() 122/62  Arterial Line BP: ()/(28-62) 116/49    Physical Exam:   General Appearance: alert, appears stated age and cooperative   Lungs: clear to auscultation, respirations regular, respirations even and respirations unlabored   Heart: regular rhythm & normal rate, normal S1, S2 and no murmur, no gallop, no rub   Skin: Incision c/d/i   Tongue midline, voice normal  Results:  Results from last 7 days   Lab Units 11/06/20  0511   WBC 10*3/mm3 8.48   HEMOGLOBIN g/dL 12.3   HEMATOCRIT % 39.4   PLATELETS 10*3/mm3 343     Results from last 7 days   Lab Units 11/06/20  0510   SODIUM mmol/L 142   POTASSIUM mmol/L 3.2*   CHLORIDE mmol/L 113*   CO2 mmol/L 17.0*   BUN mg/dL 11   CREATININE mg/dL 0.48*   GLUCOSE mg/dL 188*   CALCIUM mg/dL 7.1*       Assessment:  POD # 1 s/p Left CEA    Plan:  D/C Drain  Ambulate  ASA, plavix  D/C home today    Gerardo Paige MD  11/06/20  07:29 EST

## 2020-11-06 NOTE — PROGRESS NOTES
Continued Stay Note  [facility]     Patient Name: Delia Rodriguez  MRN: [id number]  Today's Date: 11/6/2020    Admit Date: 11/5/2020    Discharge Plan     Row Name 11/06/20 1108       Plan    Plan Comments  Pt lives with her  in [place]. She reports she was independent with ADLs prior to admit and denies use of any DME/HH. She is followed by her PCP and has some drug coverage. At this time her plan for discharge is to return home. She denies any discharge needs at this time.    Final Discharge Disposition Code  01 - home or self-care        Discharge Codes    No documentation.       Expected Discharge Date and Time     Expected Discharge Date Expected Discharge Time    Nov 6, 2020             Denisse Rivera RN

## 2020-11-06 NOTE — PROGRESS NOTES
Clinical Nutrition     Multidisciplinary Rounds      Patient Name: Delia Rodriguez  Date of Encounter: 11/06/20 09:53 EST  MRN: [id number]  Admission date: 11/5/2020    DANA ARORA to continue to follow per protocol.       Current diet: Diet Regular; Consistent Carbohydrate, Cardiac  No active supplement orders      Intervention:  Follow treatment plan  Care plan reviewed    Follow up:   Per protocol      Starr James RD  09:53 EST  Time: 10min

## 2020-11-09 LAB
CYTO UR: NORMAL
LAB AP CASE REPORT: NORMAL
LAB AP CLINICAL INFORMATION: NORMAL
PATH REPORT.FINAL DX SPEC: NORMAL
PATH REPORT.GROSS SPEC: NORMAL

## 2020-11-16 NOTE — DISCHARGE SUMMARY
CTS Discharge Summary    Patient Care Team:  Dustin Cruz MD as PCP - General (Internal Medicine)      Date of Admission: 11/5/2020 10:03 AM  Date of Discharge: 11 [id number]    Discharge Diagnosis  Past Medical History:   Diagnosis Date   • Arthritis    • Carotid stenosis    • GERD (gastroesophageal reflux disease)    • Hyperlipidemia    • Hypertension    • IBS (irritable bowel syndrome)    • Pre-diabetes          Stenosis of left carotid artery    Hypertension    Hyperlipidemia    Pre-diabetes      History of Present Illness  Delia Rodriguez is a 75 y.o. female with a history of hyperlipidemia, hypertension, prediabetes and left carotid stenosis.  She presents today for review of her CTA of the carotids and MRI brain as well as discussion of left carotid endarterectomy.   MRI of the brain 9/8/2020 with age-appropriate cerebral atrophy and no areas of ischemia.  CTA of the carotids revealed no significant stenosis stenosis in the right carotid artery and high-grade 75% stenosis of the proximal left internal carotid artery.  Patient with history of perioral numbness.  She denies any new TIA/CVA symptoms.  Patient's father had occluded carotid artery and her mother had a significant carotid artery stenosis that was stented with periprocedural stroke.  Patient has had uncontrolled blood pressures prior to her dental procedure recently in which it was canceled. Elevated blood pressure in office today of 176/101.  She has not followed up with her primary care provider as advised by her dentist for evaluation.  Advised patient to call primary care provider and make appointment for uncontrolled blood pressure and her  was able to make appointment for Thursday, during office visit, for evaluation prior to surgery.  Will have patient return for left carotid endarterectomy with EEG.  The risk and benefits of surgery were discussed with the patient/family including pain, bleeding, infection, stroke,  hoarseness, dysphagia, myocardial infarction and death.  Patient understands these risks and wishes to proceed      Hospital Course  The patient is a 75-year-old female who was admitted on 11/5/2020 and underwent a left carotid endarterectomy with patch arterioplasty performed by Dr. Gerardo Paige.  The patient tolerated the procedure well without any immediate complications was transferred to the intensive care unit in stable condition.  On postoperative day #1, the patient was doing well having previously been extubated.  Patient was neurologically intact with no focal deficits, the tongue was midline and the voice was normal.  Patient had the CURLY drain and arterial line removed without difficulty.  The patient was ambulating independently and met criteria for discharge and was discharged home without difficulty.    Procedures Performed  Procedure(s):  CAROTID ENDARTERECTOMY WITH EEG LEFT       Consults:   Intensivist    Discharge Medications     Discharge Medications      New Medications      Instructions Start Date   clopidogrel 75 MG tablet  Commonly known as: PLAVIX  Notes to patient: Resume tomorrow morning   75 mg, Oral, Daily         Continue These Medications      Instructions Start Date   amLODIPine 5 MG tablet  Commonly known as: NORVASC  Notes to patient: Resume daily tomorrow    5 mg, Oral, Daily      aspirin 81 MG EC tablet  Notes to patient: Resume tomorrow morning   81 mg, Oral, Daily      hydrALAZINE 25 MG tablet  Commonly known as: APRESOLINE  Notes to patient: Resume tomorrow morning   25 mg, Oral, 3 Times Daily      lisinopril 10 MG tablet  Commonly known as: PRINIVIL,ZESTRIL  Notes to patient: Resume tomorrow morning   10 mg, Oral, Daily      simvastatin 10 MG tablet  Commonly known as: ZOCOR  Notes to patient: Resume tomorrow morning   10 mg, Oral, Every Other Day         ASK your doctor about these medications      Instructions Start Date   HYDROcodone-acetaminophen 7.5-325 MG per  tablet  Commonly known as: NORCO  Ask about: Should I take this medication?   1 tablet, Oral, Every 6 Hours PRN             Discharge Diet:   Diet Instructions     Diet: Consistent Carbohydrate, Cardiac      Discharge Diet:  Consistent Carbohydrate  Cardiac             Activity at Discharge:   Activity Instructions     Activity as Tolerated      Other Activity Instructions      Activity Instructions: No lifting greater than 10 pounds or driving until follow-up appointment.  No driving while taking narcotics.        Do not drive while taking narcotics    Follow-up Appointments  Future Appointments   Date Time Provider Department Center   12/16/2020  1:45 PM Starr Richter APRN [facility] None          ELVIRA Vegas  11/16/20  08:59 EST

## 2020-12-07 ENCOUNTER — HOSPITAL ENCOUNTER (OUTPATIENT)
Dept: CARDIOLOGY | Facility: HOSPITAL | Age: 75
Discharge: HOME | End: 2020-12-07
Admitting: INTERNAL MEDICINE

## 2020-12-07 ENCOUNTER — TRANSCRIBE ORDERS (OUTPATIENT)
Dept: ADMINISTRATIVE | Facility: HOSPITAL | Age: 75
End: 2020-12-07

## 2020-12-07 DIAGNOSIS — M79.661 RIGHT CALF PAIN: Primary | ICD-10-CM

## 2020-12-07 DIAGNOSIS — M79.661 RIGHT CALF PAIN: ICD-10-CM

## 2020-12-07 PROCEDURE — 93971 EXTREMITY STUDY: CPT

## 2020-12-07 PROCEDURE — 93971 EXTREMITY STUDY: CPT | Performed by: RADIOLOGY

## 2020-12-16 ENCOUNTER — OFFICE VISIT (OUTPATIENT)
Dept: CARDIAC SURGERY | Facility: CLINIC | Age: 75
End: 2020-12-16

## 2020-12-16 VITALS
HEART RATE: 77 BPM | SYSTOLIC BLOOD PRESSURE: 160 MMHG | DIASTOLIC BLOOD PRESSURE: 79 MMHG | OXYGEN SATURATION: 99 % | BODY MASS INDEX: 24.32 KG/M2 | HEIGHT: 65 IN | TEMPERATURE: 97.3 F | WEIGHT: 146 LBS

## 2020-12-16 DIAGNOSIS — I65.22 STENOSIS OF LEFT CAROTID ARTERY: Primary | ICD-10-CM

## 2020-12-16 PROCEDURE — 99024 POSTOP FOLLOW-UP VISIT: CPT | Performed by: NURSE PRACTITIONER

## 2020-12-16 NOTE — PROGRESS NOTES
[facility] Cardiothoracic Surgery Office Follow Up Note     Date of Encounter: 12/16/2020     MRN Number: [id number]  Name: Delia Rodriguez  Phone Number: [phone]     Referred By: No ref. provider found  PCP: Dustin Cruz MD    Chief Complaint:    Chief Complaint   Patient presents with   • Post-op     Hospital follow up s/p left carotid endarterectomy 11/5/20.   • Carotid Artery Disease       History of Present Illness:    Delia Rodriguez is a 75 y.o. female with a history of hypertension, hyperlipidemia, prediabetes and left carotid stenosis status post left carotid endarterectomy on 11/5/2020 with Dr. Paige.  She presents today for postoperative follow-up.  She has been doing well with no TIA/CVA symptoms.  She is on aspirin and Plavix therapy.  Patient did have an isolated right lower extremity acute pain last Sunday.  She discussed with her primary care provider and underwent venous duplex testing the next day which was negative for DVT and has had no further pain since that time.    Review of Systems:  Review of Systems   Constitution: Negative for chills, decreased appetite, fever and malaise/fatigue.   Cardiovascular: Positive for leg swelling. Negative for chest pain, claudication, dyspnea on exertion, irregular heartbeat, near-syncope, orthopnea, palpitations and syncope.   Respiratory: Negative for cough, hemoptysis, shortness of breath, sputum production and wheezing.    Hematologic/Lymphatic: Negative for bleeding problem. Bruises/bleeds easily.   Skin: Negative for color change, poor wound healing and rash.   Musculoskeletal: Positive for joint pain. Negative for back pain and falls.   Gastrointestinal: Negative for abdominal pain, constipation, diarrhea, nausea and vomiting.   Neurological: Negative for focal weakness, numbness and paresthesias.   Psychiatric/Behavioral: Negative for depression. The patient does not have insomnia.        I have reviewed the review of  systems as entered by my clinical support staff and have updated it as appropriate.       Allergies:  Allergies   Allergen Reactions   • Penicillins Hives       Medications:      Current Outpatient Medications:   •  amLODIPine (NORVASC) 5 MG tablet, Take 5 mg by mouth Daily., Disp: , Rfl:   •  aspirin 81 MG EC tablet, Take 81 mg by mouth Daily., Disp: , Rfl:   •  clopidogrel (PLAVIX) 75 MG tablet, Take 1 tablet by mouth Daily., Disp: 30 tablet, Rfl: 3  •  hydrALAZINE (APRESOLINE) 25 MG tablet, Take 25 mg by mouth 3 (Three) Times a Day., Disp: , Rfl:   •  lisinopril (PRINIVIL,ZESTRIL) 10 MG tablet, Take 10 mg by mouth Daily., Disp: , Rfl:   •  simvastatin (ZOCOR) 10 MG tablet, Take 10 mg by mouth Every Other Day., Disp: , Rfl:     Social History     Socioeconomic History   • Marital status:      Spouse name: Not on file   • Number of children: 3   • Years of education: Not on file   • Highest education level: Not on file   Occupational History   • Occupation:      Comment: Retired   Tobacco Use   • Smoking status: Never Smoker   • Smokeless tobacco: Never Used   Substance and Sexual Activity   • Alcohol use: Not Currently     Frequency: Never   • Drug use: Never   • Sexual activity: Defer   Social History Narrative    Lives in [place].        Family History   Problem Relation Age of Onset   • Thyroid cancer Mother    • Kidney cancer Mother    • Other Father        Past Medical History:   Diagnosis Date   • Arthritis    • Carotid stenosis    • GERD (gastroesophageal reflux disease)    • Hyperlipidemia    • Hypertension    • IBS (irritable bowel syndrome)    • Pre-diabetes        Past Surgical History:   Procedure Laterality Date   • BREAST SURGERY      right breast biopsy   • CAROTID ENDARTERECTOMY Left 11/5/2020    Procedure: CAROTID ENDARTERECTOMY WITH EEG LEFT;  Surgeon: Gerardo Paige MD;  Location: [facility];  Service: Vascular;  Laterality: Left;   • CATARACT EXTRACTION, BILATERAL     •  "COLONOSCOPY     • ENDOSCOPY     • GALLBLADDER SURGERY     • HYSTERECTOMY         Physical Exam:  Vital Signs:    Vitals:    12/16/20 1129   BP: 160/79   BP Location: Right arm   Patient Position: Sitting   Pulse: 77   Temp: 97.3 °F (36.3 °C)   SpO2: 99%   Weight: 66.2 kg (146 lb)   Height: 165.1 cm (65\")     Body mass index is 24.3 kg/m².     Physical Exam  Vitals signs and nursing note reviewed.   Constitutional:       Appearance: Normal appearance. She is well-developed and well-groomed.   HENT:      Head: Normocephalic and atraumatic.   Neck:      Musculoskeletal: Neck supple.   Cardiovascular:      Rate and Rhythm: Normal rate and regular rhythm.      Pulses:           Dorsalis pedis pulses are 2+ on the right side and 2+ on the left side.        Posterior tibial pulses are 2+ on the right side and 2+ on the left side.      Heart sounds: Normal heart sounds, S1 normal and S2 normal. No murmur. No friction rub.   Pulmonary:      Comments: Unlabored, Clear to auscultation bilaterally  Abdominal:      General: Bowel sounds are normal.      Palpations: Abdomen is soft.      Tenderness: There is no abdominal tenderness.   Musculoskeletal:      Right lower leg: No edema.      Left lower leg: No edema.   Skin:     General: Skin is warm and dry.      Comments: Incisions: Left leg intact  No surrounding erythema, edema or induration   Neurological:      Mental Status: She is alert and oriented to person, place, and time.      Comments: Tongue midline  Smile symmetrical   Psychiatric:         Attention and Perception: Attention normal.         Mood and Affect: Mood normal.         Speech: Speech normal.         Behavior: Behavior is cooperative.         Labs/Imaging:    Us Venous Doppler Lower Extremity Right (duplex)    Result Date: 12/8/2020  No DVT in the right lower extremity on today's exam.  This report was finalized on 12/8/2020 3:33 PM by Dr. Cornelio Lopez MD.         Assessment / Plan:  Diagnoses and all orders " for this visit:    1. Stenosis of left carotid artery (Primary)     Delia Rodriguez is a 75 y.o. female with a history of hypertension, hyperlipidemia, prediabetes and left carotid stenosis status post left carotid endarterectomy on 11/5/2020 with Dr. Paige.  Patient is stable from postoperative standpoint.  She denies any TIA/CVA symptoms.  Patient with acute right leg pain a week and a half ago with negative venous duplex no further episodes.  Palpable DP/PT pulses bilaterally.  Patient to continue aspirin and Plavix therapy.  Will have patient to follow-up in 1 year with repeat carotid duplex.    Starr Richter, [facility] Cardiothoracic Surgery    Please note that portions of this note may have been completed with a voice recognition program. Efforts were made to edit the dictations, but occasionally words are mistranscribed.

## 2021-01-27 ENCOUNTER — IMMUNIZATION (OUTPATIENT)
Dept: VACCINE CLINIC | Facility: HOSPITAL | Age: 76
End: 2021-01-27

## 2021-01-27 PROCEDURE — 91300 HC SARSCOV02 VAC 30MCG/0.3ML IM: CPT | Performed by: FAMILY MEDICINE

## 2021-01-27 PROCEDURE — 0001A: CPT | Performed by: FAMILY MEDICINE

## 2021-02-17 ENCOUNTER — IMMUNIZATION (OUTPATIENT)
Dept: VACCINE CLINIC | Facility: HOSPITAL | Age: 76
End: 2021-02-17

## 2021-02-17 PROCEDURE — 0002A: CPT | Performed by: INTERNAL MEDICINE

## 2021-02-17 PROCEDURE — 91300 HC SARSCOV02 VAC 30MCG/0.3ML IM: CPT | Performed by: INTERNAL MEDICINE

## 2021-11-16 DIAGNOSIS — I65.22 STENOSIS OF LEFT CAROTID ARTERY: Primary | ICD-10-CM

## 2021-12-10 ENCOUNTER — HOSPITAL ENCOUNTER (OUTPATIENT)
Dept: CARDIOLOGY | Facility: HOSPITAL | Age: 76
Discharge: HOME | End: 2021-12-10
Admitting: NURSE PRACTITIONER

## 2021-12-10 DIAGNOSIS — I65.22 STENOSIS OF LEFT CAROTID ARTERY: ICD-10-CM

## 2021-12-10 PROCEDURE — 93880 EXTRACRANIAL BILAT STUDY: CPT | Performed by: RADIOLOGY

## 2021-12-10 PROCEDURE — 93880 EXTRACRANIAL BILAT STUDY: CPT

## 2021-12-13 PROBLEM — K21.9 GASTROESOPHAGEAL REFLUX DISEASE: Status: ACTIVE | Noted: 2019-02-15

## 2021-12-13 PROBLEM — D53.1 MEGALOBLASTIC ANEMIA DUE TO VITAMIN B12 DEFICIENCY: Status: ACTIVE | Noted: 2019-02-15

## 2021-12-13 PROBLEM — G47.30 SLEEP APNEA: Status: ACTIVE | Noted: 2019-02-15

## 2021-12-13 PROBLEM — M13.0 HYPERTROPHIC POLYARTHRITIS: Status: ACTIVE | Noted: 2021-12-13

## 2021-12-13 PROBLEM — E55.9 VITAMIN D DEFICIENCY: Status: ACTIVE | Noted: 2019-02-15

## 2021-12-13 PROBLEM — M81.0 OSTEOPOROSIS: Status: ACTIVE | Noted: 2019-02-15

## 2021-12-15 ENCOUNTER — OFFICE VISIT (OUTPATIENT)
Dept: CARDIAC SURGERY | Facility: CLINIC | Age: 76
End: 2021-12-15

## 2021-12-15 VITALS
HEIGHT: 65 IN | TEMPERATURE: 97.1 F | HEART RATE: 68 BPM | SYSTOLIC BLOOD PRESSURE: 160 MMHG | BODY MASS INDEX: 24.49 KG/M2 | DIASTOLIC BLOOD PRESSURE: 100 MMHG | OXYGEN SATURATION: 99 % | WEIGHT: 147 LBS

## 2021-12-15 DIAGNOSIS — I65.22 STENOSIS OF LEFT CAROTID ARTERY: Primary | ICD-10-CM

## 2021-12-15 PROBLEM — M15.9 GENERALIZED OSTEOARTHRITIS: Status: ACTIVE | Noted: 2019-02-15

## 2021-12-15 PROCEDURE — 99213 OFFICE O/P EST LOW 20 MIN: CPT | Performed by: NURSE PRACTITIONER

## 2021-12-15 NOTE — PROGRESS NOTES
[facility] Cardiothoracic Surgery Office Follow Up Note     Date of Encounter: 12/15/2021     Name: Delia Rodriguez  : 1945     Referred By: No ref. provider found  PCP: Dustin Cruz MD    Chief Complaint:    Chief Complaint   Patient presents with   • Follow-up     1 YR FU with Carotid Duplex    • Carotid Artery Disease       Subjective      History of Present Illness:    Delia Rodriguez is a 76 y.o. female nonsmoker with hx of HTN, HLD on statin therapy, preDM, and carotid disease s/p left CEA 2020 by Dr Paige. Last seen in clinic 2020 with plans for annual surveillance. Pt denies hx of TIA or CVA since last visit, has had no new focal neurologic dysfunction, specifically vision changes or amaurosis fugax. Unfortunately, pt has taken herself off some of her BP medications and she arrives asymptomatically hypertensive in clinic today.    Review of Systems:  Review of Systems   Constitutional: Negative for chills, decreased appetite, diaphoresis, fever, malaise/fatigue, night sweats, weight gain and weight loss.   HENT: Negative for hoarse voice.    Eyes: Negative for blurred vision, double vision and visual disturbance.   Cardiovascular: Negative for chest pain, claudication, dyspnea on exertion, irregular heartbeat, leg swelling, near-syncope, orthopnea, palpitations, paroxysmal nocturnal dyspnea and syncope.   Respiratory: Negative for cough, hemoptysis, shortness of breath, sputum production and wheezing.    Hematologic/Lymphatic: Negative for adenopathy and bleeding problem. Bruises/bleeds easily.   Skin: Negative for color change, nail changes, poor wound healing and rash.   Musculoskeletal: Positive for arthritis and back pain. Negative for falls and muscle cramps.   Gastrointestinal: Negative for abdominal pain, dysphagia and heartburn.   Genitourinary: Negative for flank pain.   Neurological: Negative for brief paralysis, disturbances in coordination, dizziness, focal  weakness, headaches, light-headedness, loss of balance, numbness, paresthesias, sensory change, vertigo and weakness.   Psychiatric/Behavioral: Negative for depression and suicidal ideas.   Allergic/Immunologic: Negative for persistent infections.       I have reviewed the following portions of the patient's history: allergies, current medications, past family history, past medical history, past social history, past surgical history and problem list and confirm it's accurate.    Allergies:  Allergies   Allergen Reactions   • Penicillins Hives       Medications:      Current Outpatient Medications:   •  clopidogrel (PLAVIX) 75 MG tablet, Take 1 tablet by mouth Daily., Disp: 30 tablet, Rfl: 3  •  lisinopril (PRINIVIL,ZESTRIL) 10 MG tablet, Take 10 mg by mouth Daily., Disp: , Rfl:   •  simvastatin (ZOCOR) 10 MG tablet, Take 10 mg by mouth Every Other Day., Disp: , Rfl:   •  amLODIPine (NORVASC) 5 MG tablet, Take 5 mg by mouth Daily., Disp: , Rfl:   •  aspirin 81 MG EC tablet, Take 81 mg by mouth Daily., Disp: , Rfl:   •  hydrALAZINE (APRESOLINE) 25 MG tablet, Take 25 mg by mouth 3 (Three) Times a Day., Disp: , Rfl:     History:   Past Medical History:   Diagnosis Date   • Arthritis    • Carotid stenosis    • GERD (gastroesophageal reflux disease)    • Hyperlipidemia    • Hypertension    • IBS (irritable bowel syndrome)    • Pre-diabetes        Past Surgical History:   Procedure Laterality Date   • BREAST SURGERY      right breast biopsy   • CAROTID ENDARTERECTOMY Left 11/5/2020    Procedure: CAROTID ENDARTERECTOMY WITH EEG LEFT;  Surgeon: Gerardo Paige MD;  Location: [facility];  Service: Vascular;  Laterality: Left;   • CATARACT EXTRACTION, BILATERAL     • COLONOSCOPY     • ENDOSCOPY     • GALLBLADDER SURGERY     • HYSTERECTOMY         Social History     Socioeconomic History   • Marital status:    • Number of children: 3   Tobacco Use   • Smoking status: Never Smoker   • Smokeless tobacco: Never Used   Vaping  "Use   • Vaping Use: Never used   Substance and Sexual Activity   • Alcohol use: Not Currently   • Drug use: Never   • Sexual activity: Defer        Family History   Problem Relation Age of Onset   • Thyroid cancer Mother    • Kidney cancer Mother    • Other Father        Objective   Physical Exam:  Vitals:    12/15/21 1046   BP: 160/100   BP Location: Left arm   Patient Position: Sitting   Pulse: 68   Temp: 97.1 °F (36.2 °C)   SpO2: 99%   Weight: 66.7 kg (147 lb)   Height: 165.1 cm (65\")      Body mass index is 24.46 kg/m².    Physical Exam  Vitals and nursing note reviewed.   Constitutional:       Appearance: Normal appearance. She is well-developed.   HENT:      Head: Normocephalic and atraumatic.   Eyes:      Pupils: Pupils are equal, round, and reactive to light.   Neck:      Vascular: No carotid bruit.   Cardiovascular:      Rate and Rhythm: Normal rate and regular rhythm.      Pulses: Normal pulses.      Heart sounds: Normal heart sounds, S1 normal and S2 normal. No murmur heard.      Pulmonary:      Effort: Pulmonary effort is normal.      Breath sounds: Normal breath sounds.   Abdominal:      Palpations: Abdomen is soft.   Musculoskeletal:         General: No swelling.      Cervical back: Neck supple.      Right lower leg: No edema.      Left lower leg: No edema.   Skin:     General: Skin is warm and dry.      Capillary Refill: Capillary refill takes less than 2 seconds.      Findings: No bruising.   Neurological:      General: No focal deficit present.      Mental Status: She is alert and oriented to person, place, and time. Mental status is at baseline.      GCS: GCS eye subscore is 4. GCS verbal subscore is 5. GCS motor subscore is 6.      Motor: Motor function is intact.      Coordination: Coordination is intact.      Gait: Gait is intact.   Psychiatric:         Mood and Affect: Mood normal.         Speech: Speech normal.         Behavior: Behavior normal. Behavior is cooperative.         Cognition and " Memory: Cognition normal.       Imaging/Labs:  US Carotid Bilateral-Result Date: 12/10/2021  Impression:  No evidence of hemodynamically significant plaques or stenosis within the carotid system at this time.  This report was finalized on 12/10/2021 2:41 PM by Dr. Cornelio Lopez MD.       CTA Carotids-Result Date: 9/11/2020  Atherosclerotic changes were noted involving the carotid systems. The common carotid arteries are markedly tortuous at the base of the neck. At the bifurcations there was plaque involving both carotid systems. There was a short segment of high grade stenosis involving the internal carotid artery on the left.       Assessment / Plan      Assessment / Plan:  Diagnoses and all orders for this visit:    1. Stenosis of left carotid artery s/p LEFT CEA 11/2020 (Primary)  -     Duplex Carotid Ultrasound CAR; Future       · Carotid disease s/p left CEA 11/2020 by Dr Paige.  · Presents for annual surveillance  · No hx of interval TIA or CVA symptoms, no focal neurologist deficits, or amaurosis fugax   · Carotid duplex with no hemodynamically significant stenosis   · Asymptomatically hypertensive in clinic today. Pt has taken herself off some of her BP medication and does not routinely check at home. Instructed patient to begin BP log at home and follow-up with PCP for consistent readings >140/90  · Continue annual surveillance    Patient Education:  The BE FAST acronym helps you remember the signs and symptoms of a stroke: Balance loss, Eyesight loss, Facial dropping, Arm weakness, Speech difficulty, and Time to call 911      Follow Up:   Return in about 1 year (around 12/15/2022) for Imaging next visit.   Or sooner for any further concerns or worsening sign and symptoms. If unable to reach us in the office please dial 911 or go to the nearest emergency department.      Izabel Flowers APRFRANSICO  [facility] Cardiothoracic Surgery    Time Spent: I spent 22 minutes caring for Delia on this date of service.  This time includes time spent by me in the following activities: preparing for the visit, reviewing tests, obtaining and/or reviewing a separately obtained history, performing a medically appropriate examination and/or evaluation, counseling and educating the patient/family/caregiver, ordering medications, tests, or procedures, documenting information in the medical record, independently interpreting results and communicating that information with the patient/family/caregiver and care coordination.

## 2022-04-19 ENCOUNTER — TRANSCRIBE ORDERS (OUTPATIENT)
Dept: ADMINISTRATIVE | Facility: HOSPITAL | Age: 77
End: 2022-04-19

## 2022-04-19 DIAGNOSIS — I73.9 CLAUDICATION, INTERMITTENT: Primary | ICD-10-CM

## 2022-05-16 ENCOUNTER — HOSPITAL ENCOUNTER (OUTPATIENT)
Dept: CARDIOLOGY | Facility: HOSPITAL | Age: 77
Discharge: HOME | End: 2022-05-16
Admitting: INTERNAL MEDICINE

## 2022-05-16 DIAGNOSIS — I73.9 CLAUDICATION, INTERMITTENT: ICD-10-CM

## 2022-05-16 PROCEDURE — 93926 LOWER EXTREMITY STUDY: CPT | Performed by: RADIOLOGY

## 2022-05-16 PROCEDURE — 93926 LOWER EXTREMITY STUDY: CPT

## 2022-05-17 ENCOUNTER — APPOINTMENT (OUTPATIENT)
Dept: CARDIOLOGY | Facility: HOSPITAL | Age: 77
End: 2022-05-17

## 2022-11-09 DIAGNOSIS — I65.22 STENOSIS OF LEFT CAROTID ARTERY: Primary | ICD-10-CM

## 2022-12-19 ENCOUNTER — APPOINTMENT (OUTPATIENT)
Dept: CARDIOLOGY | Facility: HOSPITAL | Age: 77
End: 2022-12-19

## 2022-12-22 ENCOUNTER — HOSPITAL ENCOUNTER (OUTPATIENT)
Dept: CARDIOLOGY | Facility: HOSPITAL | Age: 77
Discharge: HOME | End: 2022-12-22
Admitting: NURSE PRACTITIONER

## 2022-12-22 DIAGNOSIS — I65.22 STENOSIS OF LEFT CAROTID ARTERY: ICD-10-CM

## 2022-12-22 PROCEDURE — 93880 EXTRACRANIAL BILAT STUDY: CPT | Performed by: RADIOLOGY

## 2022-12-22 PROCEDURE — 93880 EXTRACRANIAL BILAT STUDY: CPT

## 2023-03-08 ENCOUNTER — OFFICE VISIT (OUTPATIENT)
Dept: CARDIAC SURGERY | Facility: CLINIC | Age: 78
End: 2023-03-08
Payer: MEDICARE

## 2023-03-08 VITALS
DIASTOLIC BLOOD PRESSURE: 89 MMHG | BODY MASS INDEX: 24.12 KG/M2 | WEIGHT: 144.8 LBS | HEART RATE: 89 BPM | SYSTOLIC BLOOD PRESSURE: 141 MMHG | HEIGHT: 65 IN | TEMPERATURE: 97.1 F | OXYGEN SATURATION: 97 %

## 2023-03-08 DIAGNOSIS — I65.22 STENOSIS OF LEFT CAROTID ARTERY: Primary | ICD-10-CM

## 2023-03-08 PROCEDURE — 99212 OFFICE O/P EST SF 10 MIN: CPT | Performed by: REGISTERED NURSE

## 2023-03-08 NOTE — PROGRESS NOTES
..       [facility] Cardiothoracic Surgery Office Follow Up Note    Date of Encounter: 2023     Name: Delia Rodriguez  : 1945     Referred By: No ref. provider found  PCP: Dustin Cruz MD    Chief Complaint:    Chief Complaint   Patient presents with   • Carotid Artery Disease     1 year follow-up with results from carotid duplex. History of left CEA in 2020       Subjective      History of Present Illness:    It was nice to see Delia Rodriguez in follow up.  She is a pleasant 77 y.o. female with PMH significant for HTN, HLD on statin therapy, preDM, and carotid disease s/p left CEA 2020 by Dr Paige..      Ms. Rodriguez was last seen in office on 12/15/2021 at which time she was overall doing well.  Carotid duplex was without hemodynamically significant stenosis.  Patient had taken discontinued some of her BP meds and was noted to be hypertensive in office.  Patient presents to office today for surveillance with imaging.  She has not been seen by our office in over a year.  Ms Rodriguez denies symptoms of TIA or CVA since her last visit.  She reports well controlled blood pressure at this time.  Patient reports following with PCP.  Reports medication compliance.     Review of Systems:  Review of Systems   Constitutional: Negative for chills, decreased appetite, diaphoresis, fever, malaise/fatigue, night sweats, weight gain and weight loss.   HENT: Negative for hoarse voice.    Eyes: Negative for blurred vision, double vision and visual disturbance.   Cardiovascular: Negative for chest pain, claudication, dyspnea on exertion, irregular heartbeat, leg swelling, near-syncope, orthopnea, palpitations, paroxysmal nocturnal dyspnea and syncope.   Respiratory: Positive for cough (when lying down). Negative for hemoptysis, shortness of breath, sputum production and wheezing.    Hematologic/Lymphatic: Negative for adenopathy and bleeding problem. Does not bruise/bleed easily.    Skin: Negative for color change, nail changes, poor wound healing and rash.   Musculoskeletal: Positive for arthritis. Negative for back pain, falls and muscle cramps.   Gastrointestinal: Negative for abdominal pain, dysphagia and heartburn.   Genitourinary: Negative for flank pain.   Neurological: Positive for dizziness. Negative for brief paralysis, disturbances in coordination, focal weakness, headaches, light-headedness, loss of balance, numbness, paresthesias, sensory change, vertigo and weakness.   Psychiatric/Behavioral: Negative for depression and suicidal ideas. The patient is not nervous/anxious.    Allergic/Immunologic: Negative for persistent infections.       I have reviewed the following portions of the patient's history: allergies, current medications, past family history, past medical history, past social history, past surgical history and problem list and confirm it's accurate.    Allergies:  Allergies   Allergen Reactions   • Penicillins Hives       Medications:      Current Outpatient Medications:   •  aspirin 81 MG EC tablet, Take 1 tablet by mouth Daily., Disp: , Rfl:   •  clopidogrel (PLAVIX) 75 MG tablet, Take 1 tablet by mouth Daily., Disp: 30 tablet, Rfl: 3  •  lisinopril (PRINIVIL,ZESTRIL) 10 MG tablet, Take 1 tablet by mouth Daily., Disp: , Rfl:   •  simvastatin (ZOCOR) 10 MG tablet, Take 1 tablet by mouth Every Other Day., Disp: , Rfl:     History:   Past Medical History:   Diagnosis Date   • Arthritis    • Carotid stenosis    • GERD (gastroesophageal reflux disease)    • Hyperlipidemia    • Hypertension    • IBS (irritable bowel syndrome)    • Pre-diabetes        Past Surgical History:   Procedure Laterality Date   • BREAST SURGERY      right breast biopsy   • CAROTID ENDARTERECTOMY Left 11/5/2020    Procedure: CAROTID ENDARTERECTOMY WITH EEG LEFT;  Surgeon: Gerardo Paige MD;  Location: [facility];  Service: Vascular;  Laterality: Left;   • CATARACT EXTRACTION, BILATERAL     •  "COLONOSCOPY     • ENDOSCOPY     • GALLBLADDER SURGERY     • HYSTERECTOMY         Social History     Socioeconomic History   • Marital status:    • Number of children: 3   Tobacco Use   • Smoking status: Never   • Smokeless tobacco: Never   Vaping Use   • Vaping Use: Never used   Substance and Sexual Activity   • Alcohol use: Not Currently   • Drug use: Never   • Sexual activity: Defer        Family History   Problem Relation Age of Onset   • Thyroid cancer Mother    • Kidney cancer Mother    • Other Father        Objective     Physical Exam:  Vitals:    03/08/23 1232 03/08/23 1238   BP: 157/84 141/89   BP Location: Right arm Left arm   Patient Position: Sitting Sitting   Pulse: 89    Temp: 97.1 °F (36.2 °C)    SpO2: 97%    Weight: 65.7 kg (144 lb 12.8 oz)    Height: 165.1 cm (65\")  Comment: patient reported       Body mass index is 24.1 kg/m².    Physical Exam  Vitals reviewed.   Constitutional:       General: She is not in acute distress.     Appearance: Normal appearance. She is not ill-appearing.   HENT:      Head: Normocephalic.   Eyes:      Pupils: Pupils are equal, round, and reactive to light.   Neck:      Vascular: No carotid bruit.   Cardiovascular:      Rate and Rhythm: Normal rate and regular rhythm.      Heart sounds: Normal heart sounds. No murmur heard.  Pulmonary:      Effort: Pulmonary effort is normal.      Breath sounds: Normal breath sounds.   Musculoskeletal:      Cervical back: Neck supple.   Skin:     General: Skin is warm and dry.   Neurological:      General: No focal deficit present.      Mental Status: She is alert and oriented to person, place, and time.   Psychiatric:         Mood and Affect: Mood normal.         Behavior: Behavior normal.         Thought Content: Thought content normal.         Judgment: Judgment normal.         Imaging/Labs:  Carotid Duplex -- 12/22/2022  Impression:  No evidence of hemodynamically significant plaques or stenosis within  the carotid system at " this time.  This report was finalized on 12/22/2022 1:25 PM by Dr. Cornelio Lopez MD.   US Carotid Bilateral (12/22/2022 10:52)    I personally reviewed this report.     US Carotid Bilateral-Result Date: 12/10/2021  Impression:  No evidence of hemodynamically significant plaques or stenosis within the carotid system at this time.  This report was finalized on 12/10/2021 2:41 PM by Dr. Cornelio Lopez MD.      Assessment / Plan      Assessment / Plan:  PMH significant for HTN, HLD on statin therapy, preDM, and carotid disease s/p left CEA 11/2020 by Dr Paige..      1.  Bilateral Carotid Stenosis  · Last seen in office on 12/15/2021 at which time she was overall doing well.    · Carotid duplex was without hemodynamically significant stenosis.    · Patient had taken discontinued some of her BP meds and was noted to be hypertensive in office.   · Presents to office today for surveillance with imaging.  Has not been seen by our office in over a year.    · Denies symptoms of TIA or CVA since her last visit.    · Carotid duplex with no evidence of hemodynamically significant plaques or stenosis.  Right PSV: 192;  Left PSV: 107;  Bilateral antegrade flow.   · Reports well controlled blood pressure at this time.    · Reports following with PCP.    · Reports medication compliance.     Patient Education:  ..The BE FAST acronym helps you remember the signs and symptoms of a stroke: Balance loss, Eyesight loss, Facial drooping, Arm weakness, Speech difficulty, and Time to call 911    Follow Up:   We will plan for follow-up in ~1 year with imaging.    Or sooner for any further concerns or worsening sign and symptoms.  Patient encouraged to call the office with any questions or concerns.     Thank you for allowing me to participate in your care.  Best Regards,    JUVENCIO Campo  [facility] Cardiothoracic Surgery  03/08/23  17:40 EST

## 2023-04-06 ENCOUNTER — OFFICE VISIT (OUTPATIENT)
Dept: SURGERY | Facility: CLINIC | Age: 78
End: 2023-04-06
Payer: MEDICARE

## 2023-04-06 VITALS — WEIGHT: 146 LBS | HEIGHT: 65 IN | BODY MASS INDEX: 24.32 KG/M2

## 2023-04-06 DIAGNOSIS — K64.5 THROMBOSED EXTERNAL HEMORRHOID: Primary | ICD-10-CM

## 2023-04-06 PROCEDURE — 1160F RVW MEDS BY RX/DR IN RCRD: CPT | Performed by: SURGERY

## 2023-04-06 PROCEDURE — 99203 OFFICE O/P NEW LOW 30 MIN: CPT | Performed by: SURGERY

## 2023-04-06 PROCEDURE — 1159F MED LIST DOCD IN RCRD: CPT | Performed by: SURGERY

## 2023-04-06 NOTE — PROGRESS NOTES
Subjective   Delia Rodriguez is a 77 y.o. female.     Chief Complaint: hemorrhoids    History of Present Illness She is a 76 yo who had a very tender hemorrhoid a few weeks ago. It is still out but not tender now. She did have a colonoscopy 3 years ago that was ok. She also had has constipation at times and is taking fiber and a stool softener.     The following portions of the patient's history were reviewed and updated as appropriate: current medications, past family history, past medical history, past social history, past surgical history and problem list.    Review of Systems    Objective   Physical Exam she has a thrombosed external hemorrhoid that is not inflamed or tender and has some stretched out hemorrhoids externally that are not inflamed.    Past Medical History:   Diagnosis Date   • Arthritis    • Carotid stenosis    • GERD (gastroesophageal reflux disease)    • Hyperlipidemia    • Hypertension    • IBS (irritable bowel syndrome)    • Pre-diabetes        Family History   Problem Relation Age of Onset   • Thyroid cancer Mother    • Kidney cancer Mother    • Other Father        Social History     Tobacco Use   • Smoking status: Never   • Smokeless tobacco: Never   Vaping Use   • Vaping Use: Never used   Substance Use Topics   • Alcohol use: Not Currently   • Drug use: Never       Past Surgical History:   Procedure Laterality Date   • BREAST SURGERY      right breast biopsy   • CAROTID ENDARTERECTOMY Left 11/5/2020    Procedure: CAROTID ENDARTERECTOMY WITH EEG LEFT;  Surgeon: Gerardo Paige MD;  Location: [facility];  Service: Vascular;  Laterality: Left;   • CATARACT EXTRACTION, BILATERAL     • COLONOSCOPY     • ENDOSCOPY     • GALLBLADDER SURGERY     • HYSTERECTOMY         Current Outpatient Medications   Medication Instructions   • aspirin 81 mg, Oral, Every 4 Hours PRN   • clopidogrel (PLAVIX) 75 mg, Oral, Daily   • lisinopril (PRINIVIL,ZESTRIL) 10 mg, Oral, Daily   • simvastatin (ZOCOR) 10 mg,  Oral, Every Other Day         Assessment & Plan   Diagnoses and all orders for this visit:    1. Thrombosed external hemorrhoid (Primary)    Since it is not hurting now she wants to hold off on surgery and will return if they flare up again.

## 2023-06-29 ENCOUNTER — PATIENT OUTREACH (OUTPATIENT)
Dept: CASE MANAGEMENT | Facility: OTHER | Age: 78
End: 2023-06-29

## 2023-12-15 NOTE — OUTREACH NOTE
AMBULATORY CASE MANAGEMENT NOTE    Name and Relationship of Patient/Support Person: Delia Rodriguez Montgomery - Self    Patient Outreach    Patient in ED 6/24/23 after a fall; pt dx'd with muscle strain of L lower leg. Pt reports improvement in symptoms. Education provided, understanding voiced. AVS, 24/7 nurse line availability reviewed; pt to f/u with providers as scheduled.     Adult Patient Profile  Questions/Answers      Flowsheet Row Most Recent Value   Symptoms/Conditions Managed at Home cardiovascular, musculoskeletal   Barriers to Managing Health none   Cardiovascular Symptoms/Conditions hypertension   Cardiovascular Management Strategies routine screening   Cardiovascular Self-Management Outcome 4 (good)   Musculoskeletal Symptoms/Conditions osteoarthritis   Musculoskeletal Management Strategies adequate rest   Musculoskeletal Self-Management Outcome 4 (good)   Taking Medications Not Prescribed no   Missed Doses of Prescribed Medications During Past Week no   Taken Prescribed Medications at Different Time or Schedule During Past Week no   Taken More or Less Medication Than Prescribed no   Barriers to Taking Medication as Prescribed none            Education Documentation  Unresolved/Worsening Symptoms, taught by Sandra Lea, RN at 12/15/2023 12:08 PM.  Learner: Patient  Readiness: Acceptance  Method: Explanation  Response: Verbalizes Understanding    Safety, taught by Sandra Lea, RN at 12/15/2023 12:08 PM.  Learner: Patient  Readiness: Acceptance  Method: Explanation  Response: Verbalizes Understanding    Home Safety, taught by Sandra Lea, RN at 12/15/2023 12:08 PM.  Learner: Patient  Readiness: Acceptance  Method: Explanation  Response: Verbalizes Understanding    Risk Factors, taught by Sandra Lea, RN at 12/15/2023 12:08 PM.  Learner: Patient  Readiness: Acceptance  Method: Explanation  Response: Verbalizes Understanding          Sandra BRAGG  Ambulatory Case Management    12/15/2023, 12:08 EST

## 2024-02-02 DIAGNOSIS — I65.22 STENOSIS OF LEFT CAROTID ARTERY: Primary | ICD-10-CM

## 2024-03-05 ENCOUNTER — TELEPHONE (OUTPATIENT)
Dept: CARDIAC SURGERY | Facility: CLINIC | Age: 79
End: 2024-03-05
Payer: MEDICARE

## 2024-03-05 NOTE — TELEPHONE ENCOUNTER
No VM set up on home #. LVM on mobile # for patient or spouse to call us back. Patient has not had carotid ultrasound done yet and will need this before having a follow up appointment with our office (JUVENCIO Duncan). Will cancel appointment until we are able to get in touch with patient to schedule carotid ultrasound.   DISPLAY PLAN FREE TEXT

## 2024-03-16 ENCOUNTER — HOSPITAL ENCOUNTER (OUTPATIENT)
Dept: CARDIOLOGY | Facility: HOSPITAL | Age: 79
Discharge: HOME | End: 2024-03-16
Payer: MEDICARE

## 2024-03-16 DIAGNOSIS — I65.22 STENOSIS OF LEFT CAROTID ARTERY: ICD-10-CM

## 2024-03-16 PROCEDURE — 93880 EXTRACRANIAL BILAT STUDY: CPT

## 2024-03-19 PROBLEM — I65.23 CAROTID STENOSIS, ASYMPTOMATIC, BILATERAL: Status: ACTIVE | Noted: 2024-03-19

## 2024-03-19 NOTE — PROGRESS NOTES
[facility] Cardiothoracic Surgery Office Follow Up Note     Date of Encounter: 2024     Name: Delia Rodriguez  : 1945     Referred By: No ref. provider found  PCP: Dustin Cruz MD    Chief Complaint:    Chief Complaint   Patient presents with    Carotid Artery Disease     1 year follow-up with results from carotid duplex        Subjective      History of Present Illness:    Delia Rodriguez is a 78 y.o. female followed by Dr. Paige for YAMILEX s/p left CEA 2020.  PMH: HTN, hyperlipidemia, prediabetes, and YAMILEX.  Last seen in clinic 3/8/2023 without TIA or CVA symptoms.  Carotid duplex without evidence of hemodynamically significant plaque or stenosis.  Right ICA  cm/s and left  cm/s.  Patient returns for follow-up carotid duplex imaging review.  States compliance with aspirin, Plavix, and statin therapy. No hospitalizations since last visit.      Review of Systems:  Review of Systems   Constitutional: Negative for chills, decreased appetite, diaphoresis, fever, malaise/fatigue, night sweats, weight gain and weight loss.   HENT:  Negative for hoarse voice.    Eyes:  Negative for blurred vision, double vision and visual disturbance.   Cardiovascular:  Negative for chest pain, claudication, dyspnea on exertion, irregular heartbeat, leg swelling, near-syncope, orthopnea, palpitations, paroxysmal nocturnal dyspnea and syncope.   Respiratory:  Negative for cough, hemoptysis, shortness of breath, sputum production and wheezing.    Hematologic/Lymphatic: Negative for adenopathy and bleeding problem. Does not bruise/bleed easily.   Skin:  Negative for color change, nail changes, poor wound healing and rash.   Musculoskeletal:  Positive for joint pain (right ankle). Negative for back pain, falls and muscle cramps.   Gastrointestinal:  Negative for abdominal pain, dysphagia and heartburn.   Genitourinary:  Negative for flank pain.   Neurological:  Negative for brief  paralysis, disturbances in coordination, dizziness, focal weakness, headaches, light-headedness, loss of balance, numbness, paresthesias, sensory change, vertigo and weakness.   Psychiatric/Behavioral:  Negative for depression and suicidal ideas.    Allergic/Immunologic: Negative for persistent infections.       I have reviewed the following portions of the patient's history: problem list, current medications, allergies, past surgical history, past medical history, past social history, past family history, and ROS and confirm it's accurate.    Allergies:  Allergies   Allergen Reactions    Penicillins Hives       Medications:      Current Outpatient Medications:     aspirin 81 MG EC tablet, Take 1 tablet by mouth Every 4 (Four) Hours As Needed., Disp: , Rfl:     clopidogrel (PLAVIX) 75 MG tablet, Take 1 tablet by mouth Daily., Disp: 30 tablet, Rfl: 3    lisinopril (PRINIVIL,ZESTRIL) 10 MG tablet, Take 1 tablet by mouth Daily., Disp: , Rfl:     simvastatin (ZOCOR) 10 MG tablet, Take 1 tablet by mouth Every Other Day., Disp: , Rfl:     History:   Past Medical History:   Diagnosis Date    Arthritis     Carotid stenosis     GERD (gastroesophageal reflux disease)     Hyperlipidemia     Hypertension     IBS (irritable bowel syndrome)     Pre-diabetes        Past Surgical History:   Procedure Laterality Date    BREAST SURGERY      right breast biopsy    CAROTID ENDARTERECTOMY Left 11/5/2020    Procedure: CAROTID ENDARTERECTOMY WITH EEG LEFT;  Surgeon: Gerardo Paige MD;  Location: [facility];  Service: Vascular;  Laterality: Left;    CATARACT EXTRACTION, BILATERAL      COLONOSCOPY      ENDOSCOPY      GALLBLADDER SURGERY      HYSTERECTOMY         Social History     Socioeconomic History    Marital status:     Number of children: 3   Tobacco Use    Smoking status: Never    Smokeless tobacco: Never   Vaping Use    Vaping status: Never Used   Substance and Sexual Activity    Alcohol use: Not Currently    Drug use: Never     Sexual activity: Defer        Family History   Problem Relation Age of Onset    Thyroid cancer Mother     Kidney cancer Mother     Other Father        Objective   Physical Exam:  Vitals:    03/20/24 1232 03/20/24 1237   BP: 130/90 118/85   BP Location: Left arm Right arm   Patient Position: Sitting Sitting   Pulse: 72    Temp: 97.1 °F (36.2 °C)    SpO2: 98%    Weight: 64.4 kg (142 lb)       Body mass index is 23.63 kg/m².    Physical Exam  Vitals reviewed.   Constitutional:       General: She is not in acute distress.     Appearance: She is well-developed and well-groomed.   HENT:      Head: Normocephalic and atraumatic.   Eyes:      General: Lids are normal.      Conjunctiva/sclera: Conjunctivae normal.      Pupils: Pupils are equal, round, and reactive to light.   Neck:      Vascular: No carotid bruit.   Cardiovascular:      Rate and Rhythm: Normal rate and regular rhythm.      Pulses:           Carotid pulses are 2+ on the right side and 2+ on the left side.       Radial pulses are 2+ on the right side and 2+ on the left side.        Dorsalis pedis pulses are 2+ on the right side and 2+ on the left side.        Posterior tibial pulses are 2+ on the right side and 2+ on the left side.      Heart sounds: S1 normal and S2 normal. No murmur heard.  Pulmonary:      Effort: Pulmonary effort is normal. No respiratory distress.      Breath sounds: Normal breath sounds.   Musculoskeletal:         General: Normal range of motion.      Cervical back: Normal range of motion and neck supple.      Right lower leg: No edema.      Left lower leg: No edema.   Skin:     General: Skin is warm and dry.      Capillary Refill: Capillary refill takes less than 2 seconds.   Neurological:      General: No focal deficit present.      Mental Status: She is alert and oriented to person, place, and time.      GCS: GCS eye subscore is 4. GCS verbal subscore is 5. GCS motor subscore is 6.   Psychiatric:         Attention and Perception:  Attention normal.         Mood and Affect: Mood normal.         Speech: Speech normal.         Behavior: Behavior normal. Behavior is cooperative.         Imaging/Labs:  US Carotid Bilateral: 3/16/2024  (R ICA  cm/s and LICA PSV 75 cm/s)  Impression:  Result suggestive of at least moderate stenosis in the right carotid system. Recommend correlation with CTA and/or vascular follow-up   This report was finalized on 3/16/2024 11:00 AM by Dr. Cornelio Lopez MD.        Carotid Duplex -- 12/22/2022  (R ICA  cm/s and LICA  cm/s)  Impression:  No evidence of hemodynamically significant plaques or stenosis within  the carotid system at this time.  This report was finalized on 12/22/2022 1:25 PM by Dr. Cornelio Lopez MD.     US Carotid Bilateral-Result Date: 12/10/2021  Impression:  No evidence of hemodynamically significant plaques or stenosis within the carotid system at this time.  This report was finalized on 12/10/2021 2:41 PM by Dr. Cornelio Lopez MD.      Assessment / Plan      Assessment / Plan:  1. Carotid stenosis,bilateral s/p LEFT CEA 11/2020  - 78 y.o. female followed by Dr. Paige for YAMILEX s/p left CEA 11/20/2020.    - PMH: HTN, hyperlipidemia, prediabetes, and YAMILEX.    - Returns for annual carotid duplex review: stable YAMILEX.  MELANI PSV stable @ 178 cm/sec and LICA 75 cm/sec  - No TIA/CVA symptoms or vision changes  - Compliant w/ aspirin, Plavix, and statin therapy.   - Will plan to continue annual surveillance via carotid duplex exam      Patient Education: Report any neurological symptoms promptly or call 911.  Be FAST: Balance issues, Eyesight loss, Face drooping, Arm weakness, Speech changes, Time to get help       Follow Up:   Return in about 1 year (around 3/20/2025) for carotid duplex.   Or sooner for any further concerns or worsening sign and symptoms. If unable to reach us in the office please dial 911 or go to the nearest emergency department.      JUVENCIO Cox  [facility]  Cardiothoracic Surgery    Time Spent: I spent 22 minutes caring for Delia on this date of service. This time includes time spent by me in the following activities: preparing for the visit, reviewing tests, obtaining and/or reviewing a separately obtained history, performing a medically appropriate examination and/or evaluation, counseling and educating the patient/family/caregiver, documenting information in the medical record, and care coordination.

## 2024-03-20 ENCOUNTER — OFFICE VISIT (OUTPATIENT)
Dept: CARDIAC SURGERY | Facility: CLINIC | Age: 79
End: 2024-03-20
Payer: MEDICARE

## 2024-03-20 VITALS
TEMPERATURE: 97.1 F | SYSTOLIC BLOOD PRESSURE: 118 MMHG | DIASTOLIC BLOOD PRESSURE: 85 MMHG | BODY MASS INDEX: 23.63 KG/M2 | OXYGEN SATURATION: 98 % | HEART RATE: 72 BPM | WEIGHT: 142 LBS

## 2024-03-20 DIAGNOSIS — I65.23 CAROTID STENOSIS, ASYMPTOMATIC, BILATERAL: Primary | ICD-10-CM

## 2024-03-20 DIAGNOSIS — I65.22 STENOSIS OF LEFT CAROTID ARTERY: ICD-10-CM

## 2024-03-20 PROCEDURE — 3079F DIAST BP 80-89 MM HG: CPT | Performed by: NURSE PRACTITIONER

## 2024-03-20 PROCEDURE — 3074F SYST BP LT 130 MM HG: CPT | Performed by: NURSE PRACTITIONER

## 2024-03-20 PROCEDURE — 1159F MED LIST DOCD IN RCRD: CPT | Performed by: NURSE PRACTITIONER

## 2024-03-20 PROCEDURE — 1160F RVW MEDS BY RX/DR IN RCRD: CPT | Performed by: NURSE PRACTITIONER

## 2024-03-20 PROCEDURE — 99213 OFFICE O/P EST LOW 20 MIN: CPT | Performed by: NURSE PRACTITIONER

## 2024-06-21 ENCOUNTER — TRANSCRIBE ORDERS (OUTPATIENT)
Dept: ADMINISTRATIVE | Facility: HOSPITAL | Age: 79
End: 2024-06-21
Payer: MEDICARE

## 2024-06-21 DIAGNOSIS — M54.12 CERVICAL RADICULOPATHY: Primary | ICD-10-CM

## 2024-07-11 ENCOUNTER — HOSPITAL ENCOUNTER (OUTPATIENT)
Dept: MRI IMAGING | Facility: HOSPITAL | Age: 79
Discharge: HOME | End: 2024-07-11
Admitting: INTERNAL MEDICINE
Payer: MEDICARE

## 2024-07-11 DIAGNOSIS — M54.12 CERVICAL RADICULOPATHY: ICD-10-CM

## 2024-07-11 PROCEDURE — 72141 MRI NECK SPINE W/O DYE: CPT

## 2024-11-13 ENCOUNTER — TRANSCRIBE ORDERS (OUTPATIENT)
Dept: ADMINISTRATIVE | Facility: HOSPITAL | Age: 79
End: 2024-11-13
Payer: MEDICARE

## 2024-11-13 DIAGNOSIS — Z12.31 VISIT FOR SCREENING MAMMOGRAM: ICD-10-CM

## 2024-11-13 DIAGNOSIS — Z78.0 ASYMPTOMATIC MENOPAUSAL STATE: Primary | ICD-10-CM

## 2024-12-06 ENCOUNTER — HOSPITAL ENCOUNTER (OUTPATIENT)
Dept: BONE DENSITY | Facility: HOSPITAL | Age: 79
Discharge: HOME | End: 2024-12-06
Payer: MEDICARE

## 2024-12-06 ENCOUNTER — HOSPITAL ENCOUNTER (OUTPATIENT)
Dept: MAMMOGRAPHY | Facility: HOSPITAL | Age: 79
Discharge: HOME | End: 2024-12-06
Payer: MEDICARE

## 2024-12-06 DIAGNOSIS — Z78.0 ASYMPTOMATIC MENOPAUSAL STATE: ICD-10-CM

## 2024-12-06 DIAGNOSIS — Z12.31 VISIT FOR SCREENING MAMMOGRAM: ICD-10-CM

## 2024-12-06 PROCEDURE — 77067 SCR MAMMO BI INCL CAD: CPT

## 2024-12-06 PROCEDURE — 77063 BREAST TOMOSYNTHESIS BI: CPT

## 2024-12-06 PROCEDURE — 77080 DXA BONE DENSITY AXIAL: CPT

## 2025-01-08 ENCOUNTER — HOSPITAL ENCOUNTER (OUTPATIENT)
Dept: MAMMOGRAPHY | Facility: HOSPITAL | Age: 80
Discharge: HOME | End: 2025-01-08
Payer: MEDICARE

## 2025-01-08 ENCOUNTER — HOSPITAL ENCOUNTER (OUTPATIENT)
Dept: ULTRASOUND IMAGING | Facility: HOSPITAL | Age: 80
Discharge: HOME | End: 2025-01-08
Payer: MEDICARE

## 2025-01-08 DIAGNOSIS — R92.8 ABNORMAL MAMMOGRAM: ICD-10-CM

## 2025-01-08 PROCEDURE — G0279 TOMOSYNTHESIS, MAMMO: HCPCS

## 2025-01-08 PROCEDURE — 77065 DX MAMMO INCL CAD UNI: CPT

## 2025-01-08 PROCEDURE — 76642 ULTRASOUND BREAST LIMITED: CPT

## 2025-01-15 ENCOUNTER — HOSPITAL ENCOUNTER (OUTPATIENT)
Dept: ULTRASOUND IMAGING | Facility: HOSPITAL | Age: 80
Discharge: HOME | End: 2025-01-15
Payer: MEDICARE

## 2025-01-15 DIAGNOSIS — R92.8 ABNORMAL MAMMOGRAM OF LEFT BREAST: ICD-10-CM

## 2025-01-16 LAB — REF LAB TEST METHOD: NORMAL

## 2025-01-21 ENCOUNTER — TELEPHONE (OUTPATIENT)
Dept: MAMMOGRAPHY | Facility: HOSPITAL | Age: 80
End: 2025-01-21
Payer: MEDICARE

## 2025-01-21 NOTE — TELEPHONE ENCOUNTER
Patient notified of breast biopsy results and recommendations. Encouraged patient to call with further needs. Patient to follow up with Dr. Norwood 1-24-25.      ----- Message from Sravanthi Gutierrez sent at 1/21/2025  9:13 AM EST -----  PATHOLOGY:    Left breast FNA: Suspicious.    Cellular specimen with single cells and some groups with mild to moderate atypia in a clean background.    Recommend surgical consultation for excisional biopsy.    The patient will be notified of the results and recommendations by our breast care nurse.

## 2025-01-24 ENCOUNTER — OFFICE VISIT (OUTPATIENT)
Dept: SURGERY | Facility: CLINIC | Age: 80
End: 2025-01-24
Payer: MEDICARE

## 2025-01-24 VITALS
DIASTOLIC BLOOD PRESSURE: 72 MMHG | WEIGHT: 137 LBS | SYSTOLIC BLOOD PRESSURE: 122 MMHG | BODY MASS INDEX: 22.82 KG/M2 | HEART RATE: 96 BPM | HEIGHT: 65 IN

## 2025-01-24 DIAGNOSIS — N63.25 MASS OVERLAPPING MULTIPLE QUADRANTS OF LEFT BREAST: ICD-10-CM

## 2025-01-24 DIAGNOSIS — R92.8 ABNORMAL MAMMOGRAM: Primary | ICD-10-CM

## 2025-01-24 DIAGNOSIS — N63.25 MASS OVERLAPPING MULTIPLE QUADRANTS OF LEFT BREAST: Primary | ICD-10-CM

## 2025-01-24 NOTE — PROGRESS NOTES
Subjective   Delia Rodriguez is a 79 y.o. female here today for pathology review.    History of Present Illness  Ms. Rodriguez was seen in the office today for breast evaluation.  This is an 79-year-old female who underwent a screening mammogram 10/6/2025.  This demonstrated bilateral abnormalities.  On 1/8/2025 the patient underwent a diagnostic mammogram and bilateral ultrasound.  Findings in the right breast demonstrated a simple cyst while in the left breast 2 adjacent abnormal appearing lymph nodes were identified.  Patient underwent an ultrasound-guided fine-needle aspiration of the left axillary lymph node which demonstrated suspicious findings which additional tissue was recommended.  Patient denies a palpable mass or nipple discharge.  Patient has had a right breast surgical biopsy in the remote past as well as a right breast core biopsy at some point in the past, which the patient does not even recall.  There is no family history of breast or ovarian cancer.  The patient is postmenopausal and not on hormone replacement therapy.  Allergies   Allergen Reactions    Penicillins Hives     Current Outpatient Medications   Medication Sig Dispense Refill    clopidogrel (PLAVIX) 75 MG tablet Take 1 tablet by mouth Daily. 30 tablet 3    lisinopril (PRINIVIL,ZESTRIL) 10 MG tablet Take 1 tablet by mouth Daily.      simvastatin (ZOCOR) 10 MG tablet Take 1 tablet by mouth Every Other Day.      aspirin 81 MG EC tablet Take 1 tablet by mouth Every 4 (Four) Hours As Needed. (Patient not taking: Reported on 1/24/2025)       No current facility-administered medications for this visit.     Past Medical History:   Diagnosis Date    Arthritis     Carotid stenosis     GERD (gastroesophageal reflux disease)     Hyperlipidemia     Hypertension     IBS (irritable bowel syndrome)     Pre-diabetes      Past Surgical History:   Procedure Laterality Date    BREAST SURGERY      right breast biopsy    CAROTID ENDARTERECTOMY Left  "11/5/2020    Procedure: CAROTID ENDARTERECTOMY WITH EEG LEFT;  Surgeon: Gerardo Paige MD;  Location: [facility];  Service: Vascular;  Laterality: Left;    CATARACT EXTRACTION, BILATERAL      COLONOSCOPY      ENDOSCOPY      GALLBLADDER SURGERY      HYSTERECTOMY         Pertinent Review of Systems:  Respiratory: no shortness of breath  Cardiovascular: no chest pain  Other pertinent: Patient is on Plavix status post carotid endarterectomy      Objective   /72 (BP Location: Left arm)   Pulse 96   Ht 165.1 cm (65\")   Wt 62.1 kg (137 lb)   BMI 22.80 kg/m²   Physical Exam  Well-developed well-nourished female  Neck:  No supraclavicular adenopathy  Lungs:  Respiratory effort normal. Auscultation: Clear, without wheezes, rhonchi, rales  Heart:  Regular rate and rhythm, without murmur, gallop, rub.  No pedal edema  Breasts: On visual inspection the breasts are symmetrical.  Examination of the right breast demonstrates no discrete mass, skin change, or axillary adenopathy.  Examination of the left breast demonstrates no discrete mass, skin change, or axillary adenopathy.  There is some very mild bruising in the lateral 3 o'clock position..       Procedures   Ultrasound Guided Breast Biopsy Procedure Note    Preoperative Diagnosis: Hypoechoic lesion left breast, 3:00.    Postoperative Diagnosis: Same, pending final pathology report.    Operative Procedure Performed: Ultrasound guided core biopsy, left breast, 14-gauge Can-Cut breast-    Operating surgeon: Divya Norwood M.D.    Anesthesia: 1% lidocaine with epinephrine 10 ccs.    Complications: none    Approach: Lateral to medial    Description of Procedure: After discussing the risks and benefits of the procedure and obtaining consent, the patient was placed on the procedure table in the lateral position.  The left breast was scanned with a hand-held high frequency linear array ultrasound transducer, identifying the focal abnormality previously demonstrated on " ultrasound examination.  The operative site was prepped in a sterile fashion with alcohol; local anesthetic was used to infiltrate the skin and subcutaneous tissue.  Under direct ultrasound observation, local anesthetic was placed around the focal abnormality.  A small incision was made with a scalpel blade.  Under direct ultrasound observation the core needle device was placed through the incision with the tip of the needle core device just proximal to the lesion.  Biopsy was obtained.  Post-fire images demonstrated the needle core device going through the abnormality.  Multiple cores were taken in a similar fashion.  A gel-bradley clip was placed under ultrasound guidance.  Following removal of 2 cores, the instrument was removed and the area was cleansed and a dressing was placed.  The patient tolerated the procedure well.  Specimens were placed in formalin and sent for pathologic evaluation.  Results/Data:  Imaging: Mammogram and ultrasound reports and images from 1/8/2025 and 12/6/2024 and 1/15/2025 were reviewed.  I agree with the assessment  Notes:   Lab:   Other: Pathology result was reviewed.    Assessment & Plan   Suspicious findings in the lateral left breast.  On outside imaging these appear to be lymph nodes while on biopsy today in the area of concern appeared to be more consistent with a breast mass with no obvious lymph node features identified.    Plan: Pathology to be tracked and further recommendations to be made based on pathology         Discussion/Summary    Time spent:     BMI is within normal parameters. No other follow-up for BMI required.       Future Appointments   Date Time Provider Department Center   3/19/2025 11:30 AM Lianne Dotson APRN [facility]         This document has been electronically signed by       January 24, 2025 10:23 EST      Please note that portions of this note were completed with a voice recognition program.

## 2025-01-30 DIAGNOSIS — I65.22 STENOSIS OF LEFT CAROTID ARTERY: Primary | ICD-10-CM

## 2025-01-31 ENCOUNTER — OFFICE VISIT (OUTPATIENT)
Dept: SURGERY | Facility: CLINIC | Age: 80
End: 2025-01-31
Payer: MEDICARE

## 2025-01-31 VITALS
WEIGHT: 138 LBS | DIASTOLIC BLOOD PRESSURE: 84 MMHG | HEIGHT: 65 IN | SYSTOLIC BLOOD PRESSURE: 132 MMHG | HEART RATE: 72 BPM | BODY MASS INDEX: 22.99 KG/M2

## 2025-01-31 DIAGNOSIS — C50.912 MALIGNANT NEOPLASM OF LEFT BREAST IN FEMALE, ESTROGEN RECEPTOR POSITIVE, UNSPECIFIED SITE OF BREAST: Primary | ICD-10-CM

## 2025-01-31 DIAGNOSIS — Z17.0 MALIGNANT NEOPLASM OF LEFT BREAST IN FEMALE, ESTROGEN RECEPTOR POSITIVE, UNSPECIFIED SITE OF BREAST: Primary | ICD-10-CM

## 2025-01-31 LAB — REF LAB TEST METHOD: NORMAL

## 2025-01-31 NOTE — PROGRESS NOTES
Subjective   Delia Rodriguez is a 79 y.o. female here today for pathology review.    History of Present Illness  Ms. Rodriguez was seen in the office today for breast evaluation. This is an 79-year-old female who underwent a screening mammogram 10/6/2025. This demonstrated bilateral abnormalities. On 1/8/2025 the patient underwent a diagnostic mammogram and bilateral ultrasound. Findings in the right breast demonstrated a simple cyst while in the left breast 2 adjacent abnormal appearing lymph nodes were identified. Patient underwent an ultrasound-guided fine-needle aspiration of the left axillary lymph node which demonstrated suspicious findings which additional tissue was recommended. Patient denies a palpable mass or nipple discharge. Patient has had a right breast surgical biopsy in the remote past as well as a right breast core biopsy at some point in the past, which the patient does not even recall. There is no family history of breast or ovarian cancer. The patient is postmenopausal and not on hormone replacement therapy.     At the time of the patient's visit on 1/24/25, she underwent an ultrasound-guided core biopsy of the left lateral breast lesion.  This returned grade 2 invasive ductal carcinoma.  It was not clear whether this represented the same area that had been biopsied before and on pathology review no definite lymphoid tissue was identified.  Patient presents today to discuss the results of her biopsy and follow-up planning.  Allergies   Allergen Reactions    Penicillins Hives       Current Outpatient Medications   Medication Sig Dispense Refill    aspirin 81 MG EC tablet Take 1 tablet by mouth Every 4 (Four) Hours As Needed.      clopidogrel (PLAVIX) 75 MG tablet Take 1 tablet by mouth Daily. 30 tablet 3    lisinopril (PRINIVIL,ZESTRIL) 10 MG tablet Take 1 tablet by mouth Daily.      simvastatin (ZOCOR) 10 MG tablet Take 1 tablet by mouth Every Other Day.       No current  "facility-administered medications for this visit.     Past Medical History:   Diagnosis Date    Arthritis     Carotid stenosis     GERD (gastroesophageal reflux disease)     Hyperlipidemia     Hypertension     IBS (irritable bowel syndrome)     Pre-diabetes      Past Surgical History:   Procedure Laterality Date    BREAST SURGERY      right breast biopsy    CAROTID ENDARTERECTOMY Left 11/5/2020    Procedure: CAROTID ENDARTERECTOMY WITH EEG LEFT;  Surgeon: Gerardo Paige MD;  Location: [facility];  Service: Vascular;  Laterality: Left;    CATARACT EXTRACTION, BILATERAL      COLONOSCOPY      ENDOSCOPY      GALLBLADDER SURGERY      HYSTERECTOMY         Pertinent Review of Systems  Negative for chest pain or shortness of breath    Objective   /84 (BP Location: Left arm)   Pulse 72   Ht 165.1 cm (65\")   Wt 62.6 kg (138 lb)   BMI 22.96 kg/m²    Physical Exam  Unchanged from 1/24/2025 with the exception of some mild bruising in the left lateral breast at the biopsy site  Procedures   Limited left breast Ultrasound    Indication: Status post left breast biopsy    Description: With use of the 12.5 MHz transducer the area of clinical concern was scanned in multiple planes.    Findings: The area of prior biopsy was imaged and on today's examination does appear to be the same site as the 2 lymph nodes that were identified by radiology.    Impression: Clip identifiable and what clinically appears to be a lymph node    Recommendation:  Followup as clinically indicated      L-Dex® Analysis for Lymphedema  The patient had a SOZO measurement which I reviewed today. The score is   -1.0, see scanned to EMR. Bioimpedance spectroscopy helps identify the   onset of lymphedema in an arm or leg before patients experience noticeable swelling. Research has   shown that 92% of patients with early detection of lymphedema using L-Dex combined with   intervention do not progress to chronic lymphedema through three years. Additionally, " as of March 2023, the NCCN Guidelines® for Survivorship recommend proactive screening for lymphedema using   bioimpedance spectroscopy. Whenever possible, patients are tested for baseline L-Dex score before   cancer treatment begins and then are reassessed during regular follow-up visits using the SOZO device.   Otherwise, this can be started postoperatively and continued during regular follow-up visits. If the   patient's L-Dex score increases above normal levels, that is a sign that lymphedema is developing and a   referral is made to physical therapy for further evaluation and early compression treatment.   Lymphedema assessment with the SOZO L-Dex score is recommended to be done every 3 months for   the first 3 years and then every 6 months for years 4 and 5 followed by annually afterwards  Results/Data:  Imaging: Mammogram, pathology reports were reviewed with radiology and pathology      Assessment & Plan   Invasive ductal carcinoma grade 2, and what appears to be a lymph node of the left axilla.    Plan: Breast MRI to evaluate biopsy site and to look for primary with return to the office following       Discussion/Summary:    Time spent:     BMI is within normal parameters. No other follow-up for BMI required.       @AF[facility] Cross HospitalPPT    This document has been electronically signed by        January 31, 2025 12:06 EST    Please note that portions of this note were completed with a voice recognition program.

## 2025-02-03 ENCOUNTER — PATIENT OUTREACH (OUTPATIENT)
Dept: ONCOLOGY | Facility: CLINIC | Age: 80
End: 2025-02-03
Payer: MEDICARE

## 2025-02-03 NOTE — SIGNIFICANT NOTE
The Nurse Navigator contacted the patient today and introduced the role of the Nurse Navigator. The patient's illness was discussed, understanding was assessed, and clarification was provided as needed. Time spent talking with patient, empathetic listening provided, and reassurance given. NN contact information was provided and encouraged patient to call with any questions or concerns. Pt. Verbalized understanding and is in agreement with plan of care. NN will follow and assist PRN.

## 2025-02-10 ENCOUNTER — HOSPITAL ENCOUNTER (OUTPATIENT)
Dept: MRI IMAGING | Facility: HOSPITAL | Age: 80
Discharge: HOME | End: 2025-02-10
Admitting: SURGERY
Payer: MEDICARE

## 2025-02-10 DIAGNOSIS — Z17.0 MALIGNANT NEOPLASM OF LEFT BREAST IN FEMALE, ESTROGEN RECEPTOR POSITIVE, UNSPECIFIED SITE OF BREAST: ICD-10-CM

## 2025-02-10 DIAGNOSIS — C50.912 MALIGNANT NEOPLASM OF LEFT BREAST IN FEMALE, ESTROGEN RECEPTOR POSITIVE, UNSPECIFIED SITE OF BREAST: ICD-10-CM

## 2025-02-10 LAB — CREAT BLDA-MCNC: 0.6 MG/DL (ref 0.6–1.3)

## 2025-02-10 PROCEDURE — C8908 MRI W/O FOL W/CONT, BREAST,: HCPCS

## 2025-02-10 PROCEDURE — C8937 CAD BREAST MRI: HCPCS

## 2025-02-10 PROCEDURE — A9577 INJ MULTIHANCE: HCPCS | Performed by: SURGERY

## 2025-02-10 PROCEDURE — 82565 ASSAY OF CREATININE: CPT

## 2025-02-10 PROCEDURE — 25510000002 GADOBENATE DIMEGLUMINE 529 MG/ML SOLUTION: Performed by: SURGERY

## 2025-02-10 RX ADMIN — GADOBENATE DIMEGLUMINE 12 ML: 529 INJECTION, SOLUTION INTRAVENOUS at 14:28

## 2025-02-20 ENCOUNTER — OFFICE VISIT (OUTPATIENT)
Dept: SURGERY | Facility: CLINIC | Age: 80
End: 2025-02-20
Payer: MEDICARE

## 2025-02-20 ENCOUNTER — PATIENT OUTREACH (OUTPATIENT)
Dept: ONCOLOGY | Facility: CLINIC | Age: 80
End: 2025-02-20
Payer: MEDICARE

## 2025-02-20 VITALS
SYSTOLIC BLOOD PRESSURE: 140 MMHG | HEART RATE: 70 BPM | HEIGHT: 65 IN | DIASTOLIC BLOOD PRESSURE: 84 MMHG | WEIGHT: 139.6 LBS | BODY MASS INDEX: 23.26 KG/M2

## 2025-02-20 DIAGNOSIS — Z17.0 MALIGNANT NEOPLASM OF LEFT BREAST IN FEMALE, ESTROGEN RECEPTOR POSITIVE, UNSPECIFIED SITE OF BREAST: Primary | ICD-10-CM

## 2025-02-20 DIAGNOSIS — C50.912 MALIGNANT NEOPLASM OF LEFT BREAST IN FEMALE, ESTROGEN RECEPTOR POSITIVE, UNSPECIFIED SITE OF BREAST: Primary | ICD-10-CM

## 2025-02-20 PROCEDURE — 99214 OFFICE O/P EST MOD 30 MIN: CPT | Performed by: SURGERY

## 2025-02-20 PROCEDURE — 3079F DIAST BP 80-89 MM HG: CPT | Performed by: SURGERY

## 2025-02-20 PROCEDURE — 1159F MED LIST DOCD IN RCRD: CPT | Performed by: SURGERY

## 2025-02-20 PROCEDURE — 3077F SYST BP >= 140 MM HG: CPT | Performed by: SURGERY

## 2025-02-20 PROCEDURE — 1160F RVW MEDS BY RX/DR IN RCRD: CPT | Performed by: SURGERY

## 2025-02-20 NOTE — H&P
Subjective  Delia Rodriguez is a 79 y.o. female here today for MRI results.    History of Present Illness  Ms. Rodriguez was seen in the office today for follow up of her new diagnosis of left breast cancer.  This is an 79-year-old female who underwent a screening mammogram 10/6/2025. This demonstrated bilateral abnormalities. On 1/8/2025 the patient underwent a diagnostic mammogram and bilateral ultrasound. Findings in the right breast demonstrated a simple cyst while in the left breast 2 adjacent abnormal appearing lymph nodes were identified. Patient underwent an ultrasound-guided fine-needle aspiration of the left axillary lymph node which demonstrated suspicious findings which additional tissue was recommended.  Repeat biopsy was performed on 1/24/2025 and returned grade 2 invasive ductal carcinoma.  There was some ambiguity as to whether or not the area of biopsy was different than the site of radiology biopsy and in particular whether 1 was the primary tumor and 1 was biopsy of a lymph node.  This was discussed with pathology and breast radiology and the patient underwent an MRI of the breasts to further clarify.  She returns today to discuss those results which demonstrated no suspicious findings in the right breast and the single known malignancy in the left breast.  Patient denies any changes to her medical history or exam since her last visit 1/23/2025.  Allergies   Allergen Reactions    Penicillins Hives       Current Outpatient Medications   Medication Sig Dispense Refill    aspirin 81 MG EC tablet Take 1 tablet by mouth Every 4 (Four) Hours As Needed.      clopidogrel (PLAVIX) 75 MG tablet Take 1 tablet by mouth Daily. 30 tablet 3    lisinopril (PRINIVIL,ZESTRIL) 10 MG tablet Take 1 tablet by mouth Daily.      simvastatin (ZOCOR) 10 MG tablet Take 1 tablet by mouth Every Other Day.       No current facility-administered medications for this visit.     Past Medical History:   Diagnosis Date     [Mother] : mother Arthritis     Carotid stenosis     GERD (gastroesophageal reflux disease)     Hyperlipidemia     Hypertension     IBS (irritable bowel syndrome)     Pre-diabetes      Past Surgical History:   Procedure Laterality Date    BREAST SURGERY      right breast biopsy    CAROTID ENDARTERECTOMY Left 11/5/2020    Procedure: CAROTID ENDARTERECTOMY WITH EEG LEFT;  Surgeon: Gerarod Paige MD;  [facility]: [facility];  Service: Vascular;  Laterality: Left;    CATARACT EXTRACTION, BILATERAL      COLONOSCOPY      ENDOSCOPY      GALLBLADDER SURGERY      HYSTERECTOMY         Pertinent Review of Systems  Negative for chest pain or shortness of breath.  Patient is on Plavix status post carotid endarterectomy.  She reports aspirin as one of her medications but states she does not regularly take this.    Objective  There were no vitals taken for this visit.   Physical Exam  Well-developed well-nourished female  Neck:  No supraclavicular adenopathy  Lungs:  Respiratory effort normal. Auscultation: Clear, without wheezes, rhonchi, rales  Heart:  Regular rate and rhythm, without murmur, gallop, rub.  No pedal edema  Breasts: On visual inspection the breasts are symmetrical.  Examination of the right breast demonstrates no discrete mass, skin change, or axillary adenopathy.  On examination of the left breast there is a subtle area of firmness laterally at 3:00.  There is no skin change or axillary adenopathy.       Procedures     Results/Data:  Imaging: MRI reports and images from 2/10/2025 were reviewed.  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    Proceed with left lumpectomy with sentinel node biopsy, possible left axillary node dissection.  Patient to discontinue her Plavix today in anticipation of upcoming surgery       Discussion/Summary: Breast navigator Heather Bertrand was present for the discussion.  The options of  mastectomy versus lumpectomy were discussed and the issue of obtaining clear margins was also discussed with the patient.  Role of sentinel node biopsy was discussed and is implications for needs for complete axillary node dissection.  Lastly the roles of chemotherapy and radiation therapy were discussed.    Time spent:     BMI is within normal parameters. No other follow-up for BMI required.       @AFUTAPPT    This document has been electronically signed by        February 20, 2025 11:21 EST    Please note that portions of this note were completed with a voice recognition program.  This document has been electronically signed by Divya RAINEY MD on February 20, 2025 15:23 EST

## 2025-02-20 NOTE — H&P (VIEW-ONLY)
Subjective  Delia Rodriguez is a 79 y.o. female here today for MRI results.    History of Present Illness  Ms. Rodriguez was seen in the office today for follow up of her new diagnosis of left breast cancer.  This is an 79-year-old female who underwent a screening mammogram 10/6/2025. This demonstrated bilateral abnormalities. On 1/8/2025 the patient underwent a diagnostic mammogram and bilateral ultrasound. Findings in the right breast demonstrated a simple cyst while in the left breast 2 adjacent abnormal appearing lymph nodes were identified. Patient underwent an ultrasound-guided fine-needle aspiration of the left axillary lymph node which demonstrated suspicious findings which additional tissue was recommended.  Repeat biopsy was performed on 1/24/2025 and returned grade 2 invasive ductal carcinoma.  There was some ambiguity as to whether or not the area of biopsy was different than the site of radiology biopsy and in particular whether 1 was the primary tumor and 1 was biopsy of a lymph node.  This was discussed with pathology and breast radiology and the patient underwent an MRI of the breasts to further clarify.  She returns today to discuss those results which demonstrated no suspicious findings in the right breast and the single known malignancy in the left breast.  Patient denies any changes to her medical history or exam since her last visit 1/23/2025.  Allergies   Allergen Reactions    Penicillins Hives       Current Outpatient Medications   Medication Sig Dispense Refill    aspirin 81 MG EC tablet Take 1 tablet by mouth Every 4 (Four) Hours As Needed.      clopidogrel (PLAVIX) 75 MG tablet Take 1 tablet by mouth Daily. 30 tablet 3    lisinopril (PRINIVIL,ZESTRIL) 10 MG tablet Take 1 tablet by mouth Daily.      simvastatin (ZOCOR) 10 MG tablet Take 1 tablet by mouth Every Other Day.       No current facility-administered medications for this visit.     Past Medical History:   Diagnosis Date     Arthritis     Carotid stenosis     GERD (gastroesophageal reflux disease)     Hyperlipidemia     Hypertension     IBS (irritable bowel syndrome)     Pre-diabetes      Past Surgical History:   Procedure Laterality Date    BREAST SURGERY      right breast biopsy    CAROTID ENDARTERECTOMY Left 11/5/2020    Procedure: CAROTID ENDARTERECTOMY WITH EEG LEFT;  Surgeon: Gerardo Paige MD;  Location: [facility];  Service: Vascular;  Laterality: Left;    CATARACT EXTRACTION, BILATERAL      COLONOSCOPY      ENDOSCOPY      GALLBLADDER SURGERY      HYSTERECTOMY         Pertinent Review of Systems  Negative for chest pain or shortness of breath.  Patient is on Plavix status post carotid endarterectomy.  She reports aspirin as one of her medications but states she does not regularly take this.    Objective  There were no vitals taken for this visit.   Physical Exam  Well-developed well-nourished female  Neck:  No supraclavicular adenopathy  Lungs:  Respiratory effort normal. Auscultation: Clear, without wheezes, rhonchi, rales  Heart:  Regular rate and rhythm, without murmur, gallop, rub.  No pedal edema  Breasts: On visual inspection the breasts are symmetrical.  Examination of the right breast demonstrates no discrete mass, skin change, or axillary adenopathy.  On examination of the left breast there is a subtle area of firmness laterally at 3:00.  There is no skin change or axillary adenopathy.       Procedures     Results/Data:  Imaging: MRI reports and images from 2/10/2025 were reviewed.  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    Proceed with left lumpectomy with sentinel node biopsy, possible left axillary node dissection.  Patient to discontinue her Plavix today in anticipation of upcoming surgery       Discussion/Summary: Breast navigator Heather Bertrand was present for the discussion.  The options of  mastectomy versus lumpectomy were discussed and the issue of obtaining clear margins was also discussed with the patient.  Role of sentinel node biopsy was discussed and is implications for needs for complete axillary node dissection.  Lastly the roles of chemotherapy and radiation therapy were discussed.    Time spent:     BMI is within normal parameters. No other follow-up for BMI required.       @[email or web address]    This document has been electronically signed by        February 20, 2025 11:21 EST    Please note that portions of this note were completed with a voice recognition program.  This document has been electronically signed by Divya RAINEY MD on February 20, 2025 15:23 EST

## 2025-02-20 NOTE — PROGRESS NOTES
Subjective   Delia Rodriguez is a 79 y.o. female here today for MRI results.    History of Present Illness  Ms. Rodriguez was seen in the office today for follow up of her new diagnosis of left breast cancer.  This is an 79-year-old female who underwent a screening mammogram 10/6/2025. This demonstrated bilateral abnormalities. On 1/8/2025 the patient underwent a diagnostic mammogram and bilateral ultrasound. Findings in the right breast demonstrated a simple cyst while in the left breast 2 adjacent abnormal appearing lymph nodes were identified. Patient underwent an ultrasound-guided fine-needle aspiration of the left axillary lymph node which demonstrated suspicious findings which additional tissue was recommended.  Repeat biopsy was performed on 1/24/2025 and returned grade 2 invasive ductal carcinoma.  There was some ambiguity as to whether or not the area of biopsy was different than the site of radiology biopsy and in particular whether 1 was the primary tumor and 1 was biopsy of a lymph node.  This was discussed with pathology and breast radiology and the patient underwent an MRI of the breasts to further clarify.  She returns today to discuss those results which demonstrated no suspicious findings in the right breast and the single known malignancy in the left breast.  Patient denies any changes to her medical history or exam since her last visit 1/23/2025.  Allergies   Allergen Reactions    Penicillins Hives       Current Outpatient Medications   Medication Sig Dispense Refill    aspirin 81 MG EC tablet Take 1 tablet by mouth Every 4 (Four) Hours As Needed.      clopidogrel (PLAVIX) 75 MG tablet Take 1 tablet by mouth Daily. 30 tablet 3    lisinopril (PRINIVIL,ZESTRIL) 10 MG tablet Take 1 tablet by mouth Daily.      simvastatin (ZOCOR) 10 MG tablet Take 1 tablet by mouth Every Other Day.       No current facility-administered medications for this visit.     Past Medical History:   Diagnosis Date     Arthritis     Carotid stenosis     GERD (gastroesophageal reflux disease)     Hyperlipidemia     Hypertension     IBS (irritable bowel syndrome)     Pre-diabetes      Past Surgical History:   Procedure Laterality Date    BREAST SURGERY      right breast biopsy    CAROTID ENDARTERECTOMY Left 11/5/2020    Procedure: CAROTID ENDARTERECTOMY WITH EEG LEFT;  Surgeon: Gerardo Paige MD;  Location: [facility];  Service: Vascular;  Laterality: Left;    CATARACT EXTRACTION, BILATERAL      COLONOSCOPY      ENDOSCOPY      GALLBLADDER SURGERY      HYSTERECTOMY         Pertinent Review of Systems  Negative for chest pain or shortness of breath.  Patient is on Plavix status post carotid endarterectomy.  She reports aspirin as one of her medications but states she does not regularly take this.    Objective   There were no vitals taken for this visit.   Physical Exam  Well-developed well-nourished female  Neck:  No supraclavicular adenopathy  Lungs:  Respiratory effort normal. Auscultation: Clear, without wheezes, rhonchi, rales  Heart:  Regular rate and rhythm, without murmur, gallop, rub.  No pedal edema  Breasts: On visual inspection the breasts are symmetrical.  Examination of the right breast demonstrates no discrete mass, skin change, or axillary adenopathy.  On examination of the left breast there is a subtle area of firmness laterally at 3:00.  There is no skin change or axillary adenopathy.       Procedures     Results/Data:  Imaging: MRI reports and images from 2/10/2025 were reviewed.  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    Proceed with left lumpectomy with sentinel node biopsy, possible left axillary node dissection.  Patient to discontinue her Plavix today in anticipation of upcoming surgery       Discussion/Summary: Breast navigator Heather Bertrand was present for the discussion.  The options  of mastectomy versus lumpectomy were discussed and the issue of obtaining clear margins was also discussed with the patient.  Role of sentinel node biopsy was discussed and is implications for needs for complete axillary node dissection.  Lastly the roles of chemotherapy and radiation therapy were discussed.    Time spent:     BMI is within normal parameters. No other follow-up for BMI required.       @[email or web address]    This document has been electronically signed by        February 20, 2025 11:21 EST    Please note that portions of this note were completed with a voice recognition program.

## 2025-02-20 NOTE — SIGNIFICANT NOTE
Met with patient during a follow up with Dr. Norwood. Results from MRI were given, recommendations were given, surgery date picked for February 25th for Lumpectomy. NN addressed understanding of plan of care, patient's next steps, and provided clarification as needed. NN contact information was provided and encouraged patient to call with any questions or concerns. Pt. Verbalized understanding and is in agreement with plan of care. NN will follow and assist PRN

## 2025-02-21 ENCOUNTER — PRE-ADMISSION TESTING (OUTPATIENT)
Dept: PREADMISSION TESTING | Facility: HOSPITAL | Age: 80
End: 2025-02-21
Payer: MEDICARE

## 2025-02-21 DIAGNOSIS — Z17.0 MALIGNANT NEOPLASM OF LEFT BREAST IN FEMALE, ESTROGEN RECEPTOR POSITIVE, UNSPECIFIED SITE OF BREAST: ICD-10-CM

## 2025-02-21 DIAGNOSIS — C50.912 MALIGNANT NEOPLASM OF LEFT BREAST IN FEMALE, ESTROGEN RECEPTOR POSITIVE, UNSPECIFIED SITE OF BREAST: ICD-10-CM

## 2025-02-21 LAB
ALBUMIN SERPL-MCNC: 3.9 G/DL (ref 3.5–5.2)
ALBUMIN/GLOB SERPL: 1.2 G/DL
ALP SERPL-CCNC: 89 U/L (ref 39–117)
ALT SERPL W P-5'-P-CCNC: 9 U/L (ref 1–33)
ANION GAP SERPL CALCULATED.3IONS-SCNC: 9.2 MMOL/L (ref 5–15)
AST SERPL-CCNC: 16 U/L (ref 1–32)
BASOPHILS # BLD AUTO: 0.03 10*3/MM3 (ref 0–0.2)
BASOPHILS NFR BLD AUTO: 0.3 % (ref 0–1.5)
BILIRUB SERPL-MCNC: 0.3 MG/DL (ref 0–1.2)
BUN SERPL-MCNC: 12 MG/DL (ref 8–23)
BUN/CREAT SERPL: 14.5 (ref 7–25)
CALCIUM SPEC-SCNC: 9.2 MG/DL (ref 8.6–10.5)
CHLORIDE SERPL-SCNC: 106 MMOL/L (ref 98–107)
CO2 SERPL-SCNC: 26.8 MMOL/L (ref 22–29)
CREAT SERPL-MCNC: 0.83 MG/DL (ref 0.57–1)
DEPRECATED RDW RBC AUTO: 49.2 FL (ref 37–54)
EGFRCR SERPLBLD CKD-EPI 2021: 70.9 ML/MIN/1.73
EOSINOPHIL # BLD AUTO: 0.16 10*3/MM3 (ref 0–0.4)
EOSINOPHIL NFR BLD AUTO: 1.5 % (ref 0.3–6.2)
ERYTHROCYTE [DISTWIDTH] IN BLOOD BY AUTOMATED COUNT: 14.6 % (ref 12.3–15.4)
GLOBULIN UR ELPH-MCNC: 3.2 GM/DL
GLUCOSE SERPL-MCNC: 116 MG/DL (ref 65–99)
HCT VFR BLD AUTO: 46.3 % (ref 34–46.6)
HGB BLD-MCNC: 14.4 G/DL (ref 12–15.9)
IMM GRANULOCYTES # BLD AUTO: 0.04 10*3/MM3 (ref 0–0.05)
IMM GRANULOCYTES NFR BLD AUTO: 0.4 % (ref 0–0.5)
LYMPHOCYTES # BLD AUTO: 3.05 10*3/MM3 (ref 0.7–3.1)
LYMPHOCYTES NFR BLD AUTO: 29.3 % (ref 19.6–45.3)
MCH RBC QN AUTO: 28.1 PG (ref 26.6–33)
MCHC RBC AUTO-ENTMCNC: 31.1 G/DL (ref 31.5–35.7)
MCV RBC AUTO: 90.4 FL (ref 79–97)
MONOCYTES # BLD AUTO: 0.77 10*3/MM3 (ref 0.1–0.9)
MONOCYTES NFR BLD AUTO: 7.4 % (ref 5–12)
NEUTROPHILS NFR BLD AUTO: 6.36 10*3/MM3 (ref 1.7–7)
NEUTROPHILS NFR BLD AUTO: 61.1 % (ref 42.7–76)
NRBC BLD AUTO-RTO: 0 /100 WBC (ref 0–0.2)
PLATELET # BLD AUTO: 335 10*3/MM3 (ref 140–450)
PMV BLD AUTO: 9.6 FL (ref 6–12)
POTASSIUM SERPL-SCNC: 4.2 MMOL/L (ref 3.5–5.2)
PROT SERPL-MCNC: 7.1 G/DL (ref 6–8.5)
RBC # BLD AUTO: 5.12 10*6/MM3 (ref 3.77–5.28)
SODIUM SERPL-SCNC: 142 MMOL/L (ref 136–145)
WBC NRBC COR # BLD AUTO: 10.41 10*3/MM3 (ref 3.4–10.8)

## 2025-02-21 PROCEDURE — 93010 ELECTROCARDIOGRAM REPORT: CPT | Performed by: SPECIALIST

## 2025-02-21 PROCEDURE — 80053 COMPREHEN METABOLIC PANEL: CPT

## 2025-02-21 PROCEDURE — 36415 COLL VENOUS BLD VENIPUNCTURE: CPT

## 2025-02-21 PROCEDURE — 85025 COMPLETE CBC W/AUTO DIFF WBC: CPT

## 2025-02-21 PROCEDURE — 93005 ELECTROCARDIOGRAM TRACING: CPT

## 2025-02-21 NOTE — DISCHARGE INSTRUCTIONS
2/25/25  ARRIVAL TIME PER DR OFFICE    TAKE the following medications the morning of surgery:  All heart or blood pressure medications    HOLD all diabetic medications the morning of surgery as ordered by physician.    Please discontinue all blood thinners and anticoagulants (except aspirin) prior to surgery as per your surgeon and cardiologist instructions.  Aspirin may be continued up to the day prior to surgery.     CHLORHEXIDINE CLOTHS GIVEN WITH INSTRUCTIONS AND FORM TO RETURN TO HOSPITAL, IF APPLICABLE.    General Instructions:  Do not eat or drink after midnight: includes water, mints, or gum. You may brush your teeth.  Dental appliances that are removable must be taken out day of surgery.  Do not smoke, chew tobacco, or drink alcohol.  Bring medications in original bottles, any inhalers and if applicable your C-PAP/BI-PAP machine.  Bring any papers given to you in the doctor's office.  Wear clean comfortable clothes and socks.  Do not wear contact lenses or make-up. Bring a case for your glasses if applicable.  Bring crutches or walker if applicable.  Leave all other valuables and jewelry at home.    If you were given a blood bank ID arm band remember to bring it with you the day of surgery.    Preventing a Surgical Site Infection:  Shower the night before surgery (unless instructed other wise) using a fresh bar of anti-bacterial soap (such as Dial) and clean washcloth. Dry with a clean towel and dress in clean clothing.  For 2 to 3 days before surgery, avoid shaving with a razor near where you will have surgery because the razor can irritate skin and make it easier to develop an infection. Ask your surgeon if you will be receiving antibiotics prior to surgery.  Make sure you, your family, and all healthcare providers clear their hands with soap and water or an alcohol-based hand  before caring for you or your wound.  If at all possible, quit smoking as many days before surgery as you can.    Day of  surgery:  Upon arrival, a Pre-op nurse and Anesthesiologist will review your health history, obtain vital signs, and answer questions you may have. The only belongings needed at this time will be your home medications and if applicable your C-PAP/BI-PAP machine. If you are staying overnight your family can leave the rest of your belongings in the car and bring them to your room later. A Pre-op nurse will start an IV and you may receive medication in preparation for surgery, including something to help you relax. Your family will be able to see you in the Pre-op area. While you are in surgery your family should notify the waiting room  if they leave the waiting room area and provide a contact phone number.    Please be aware that surgery does come with discomfort. We want to make every effort to control your discomfort so please discuss any uncontrolled symptoms with your nurse. Your doctor will most likely have prescribed pain medications.    If you are going home after surgery you will receive individualized written care instructions before being discharged. A responsible adult must drive you to and from the hospital on the day of surgery and stay with you for 24 hours.    If you are staying overnight following surgery, you will be transported to your hospital room following the recovery period.     If you have any questions please call Pre-Admission Testing at [phone] Ext [phone] Monday-Friday 8:00-3:00  Deductibles and co-payments are collected on the day of service. Please be prepared to pay the required co-pay, deductible or deposit on the day of service as defined by your plan.    A RESPONSIBLE PERSON MUST REMAIN IN THE WAITING ROOM DURING YOUR PROCEDURE AND A RESPONSIBLE  MUST BE AVAILABLE UPON YOUR DISCHARGE.

## 2025-02-22 LAB
QT INTERVAL: 410 MS
QTC INTERVAL: 412 MS

## 2025-02-24 ENCOUNTER — HOSPITAL ENCOUNTER (OUTPATIENT)
Dept: ULTRASOUND IMAGING | Facility: HOSPITAL | Age: 80
Discharge: HOME | End: 2025-02-24
Admitting: NURSE PRACTITIONER
Payer: MEDICARE

## 2025-02-24 ENCOUNTER — TELEPHONE (OUTPATIENT)
Dept: SURGERY | Facility: CLINIC | Age: 80
End: 2025-02-24
Payer: MEDICARE

## 2025-02-24 DIAGNOSIS — I65.22 STENOSIS OF LEFT CAROTID ARTERY: ICD-10-CM

## 2025-02-24 PROCEDURE — 93880 EXTRACRANIAL BILAT STUDY: CPT | Performed by: RADIOLOGY

## 2025-02-24 PROCEDURE — 93880 EXTRACRANIAL BILAT STUDY: CPT

## 2025-02-25 ENCOUNTER — HOSPITAL ENCOUNTER (OUTPATIENT)
Dept: NUCLEAR MEDICINE | Facility: HOSPITAL | Age: 80
Discharge: HOME | End: 2025-02-25
Payer: MEDICARE

## 2025-02-25 ENCOUNTER — ANESTHESIA EVENT (OUTPATIENT)
Dept: PERIOP | Facility: HOSPITAL | Age: 80
End: 2025-02-25
Payer: MEDICARE

## 2025-02-25 ENCOUNTER — ANESTHESIA (OUTPATIENT)
Dept: PERIOP | Facility: HOSPITAL | Age: 80
End: 2025-02-25
Payer: MEDICARE

## 2025-02-25 ENCOUNTER — HOSPITAL ENCOUNTER (OUTPATIENT)
Facility: HOSPITAL | Age: 80
Setting detail: HOSPITAL OUTPATIENT SURGERY
Discharge: HOME | End: 2025-02-25
Attending: SURGERY | Admitting: SURGERY
Payer: MEDICARE

## 2025-02-25 ENCOUNTER — APPOINTMENT (OUTPATIENT)
Dept: MAMMOGRAPHY | Facility: HOSPITAL | Age: 80
End: 2025-02-25
Payer: MEDICARE

## 2025-02-25 VITALS
WEIGHT: 136.8 LBS | BODY MASS INDEX: 22.79 KG/M2 | SYSTOLIC BLOOD PRESSURE: 195 MMHG | HEIGHT: 65 IN | DIASTOLIC BLOOD PRESSURE: 95 MMHG | TEMPERATURE: 97.9 F | HEART RATE: 84 BPM | RESPIRATION RATE: 16 BRPM | OXYGEN SATURATION: 93 %

## 2025-02-25 DIAGNOSIS — Z17.0 MALIGNANT NEOPLASM OF LEFT BREAST IN FEMALE, ESTROGEN RECEPTOR POSITIVE, UNSPECIFIED SITE OF BREAST: ICD-10-CM

## 2025-02-25 DIAGNOSIS — R92.8 ABNORMAL MAMMOGRAM: Primary | ICD-10-CM

## 2025-02-25 DIAGNOSIS — C50.912 MALIGNANT NEOPLASM OF LEFT BREAST IN FEMALE, ESTROGEN RECEPTOR POSITIVE, UNSPECIFIED SITE OF BREAST: ICD-10-CM

## 2025-02-25 PROCEDURE — 25010000002 CEFAZOLIN PER 500 MG: Performed by: SURGERY

## 2025-02-25 PROCEDURE — 25010000002 FENTANYL CITRATE (PF) 50 MCG/ML SOLUTION: Performed by: NURSE ANESTHETIST, CERTIFIED REGISTERED

## 2025-02-25 PROCEDURE — 25010000002 LIDOCAINE PF 2% 2 % SOLUTION: Performed by: NURSE ANESTHETIST, CERTIFIED REGISTERED

## 2025-02-25 PROCEDURE — 34310000005 TECHNETIUM FILTERED SULFUR COLLOID: Performed by: SURGERY

## 2025-02-25 PROCEDURE — 25010000002 BUPIVACAINE 0.5 % SOLUTION: Performed by: SURGERY

## 2025-02-25 PROCEDURE — 19301 PARTIAL MASTECTOMY: CPT | Performed by: SURGERY

## 2025-02-25 PROCEDURE — 38525 BIOPSY/REMOVAL LYMPH NODES: CPT | Performed by: SURGERY

## 2025-02-25 PROCEDURE — 38792 RA TRACER ID OF SENTINL NODE: CPT

## 2025-02-25 PROCEDURE — A9541 TC99M SULFUR COLLOID: HCPCS | Performed by: SURGERY

## 2025-02-25 PROCEDURE — L8000 MASTECTOMY BRA: HCPCS | Performed by: SURGERY

## 2025-02-25 PROCEDURE — 25010000002 ISOSULFAN BLUE 1 % SOLUTION: Performed by: SURGERY

## 2025-02-25 PROCEDURE — 25010000002 ONDANSETRON PER 1 MG: Performed by: NURSE ANESTHETIST, CERTIFIED REGISTERED

## 2025-02-25 PROCEDURE — 25010000002 HYDRALAZINE PER 20 MG: Performed by: ANESTHESIOLOGY

## 2025-02-25 PROCEDURE — 25810000003 LACTATED RINGERS PER 1000 ML: Performed by: ANESTHESIOLOGY

## 2025-02-25 PROCEDURE — 38900 IO MAP OF SENT LYMPH NODE: CPT | Performed by: SURGERY

## 2025-02-25 PROCEDURE — 25010000002 PROPOFOL 200 MG/20ML EMULSION: Performed by: NURSE ANESTHETIST, CERTIFIED REGISTERED

## 2025-02-25 DEVICE — LIGACLIP EXTRA LIGATING CLIP CARTRIDGES: 6 TITANIUM CLIPS/ CARTRIDGE (MEDIUM)
Type: IMPLANTABLE DEVICE | Site: BREAST | Status: FUNCTIONAL
Brand: LIGACLIP

## 2025-02-25 DEVICE — LIGACLIP EXTRA LIGATING CLIP CARTRIDGES: 6 TITANIUM CLIPS/ CARTRIDGE (SMALL)
Type: IMPLANTABLE DEVICE | Site: BREAST | Status: FUNCTIONAL
Brand: LIGACLIP

## 2025-02-25 RX ORDER — ISOSULFAN BLUE 50 MG/5ML
INJECTION, SOLUTION SUBCUTANEOUS AS NEEDED
Status: DISCONTINUED | OUTPATIENT
Start: 2025-02-25 | End: 2025-02-25 | Stop reason: HOSPADM

## 2025-02-25 RX ORDER — FAMOTIDINE 10 MG/ML
INJECTION, SOLUTION INTRAVENOUS AS NEEDED
Status: DISCONTINUED | OUTPATIENT
Start: 2025-02-25 | End: 2025-02-25 | Stop reason: SURG

## 2025-02-25 RX ORDER — MIDAZOLAM HYDROCHLORIDE 1 MG/ML
0.5 INJECTION, SOLUTION INTRAMUSCULAR; INTRAVENOUS
Status: DISCONTINUED | OUTPATIENT
Start: 2025-02-25 | End: 2025-02-25 | Stop reason: HOSPADM

## 2025-02-25 RX ORDER — SODIUM CHLORIDE 0.9 % (FLUSH) 0.9 %
10 SYRINGE (ML) INJECTION AS NEEDED
Status: DISCONTINUED | OUTPATIENT
Start: 2025-02-25 | End: 2025-02-25 | Stop reason: HOSPADM

## 2025-02-25 RX ORDER — SODIUM CHLORIDE 0.9 % (FLUSH) 0.9 %
10 SYRINGE (ML) INJECTION EVERY 12 HOURS SCHEDULED
Status: DISCONTINUED | OUTPATIENT
Start: 2025-02-25 | End: 2025-02-25 | Stop reason: HOSPADM

## 2025-02-25 RX ORDER — FENTANYL CITRATE 50 UG/ML
INJECTION, SOLUTION INTRAMUSCULAR; INTRAVENOUS AS NEEDED
Status: DISCONTINUED | OUTPATIENT
Start: 2025-02-25 | End: 2025-02-25 | Stop reason: SURG

## 2025-02-25 RX ORDER — IPRATROPIUM BROMIDE AND ALBUTEROL SULFATE 2.5; .5 MG/3ML; MG/3ML
3 SOLUTION RESPIRATORY (INHALATION) ONCE AS NEEDED
Status: DISCONTINUED | OUTPATIENT
Start: 2025-02-25 | End: 2025-02-25 | Stop reason: HOSPADM

## 2025-02-25 RX ORDER — ONDANSETRON 2 MG/ML
4 INJECTION INTRAMUSCULAR; INTRAVENOUS AS NEEDED
Status: DISCONTINUED | OUTPATIENT
Start: 2025-02-25 | End: 2025-02-25 | Stop reason: HOSPADM

## 2025-02-25 RX ORDER — BUPIVACAINE HYDROCHLORIDE 5 MG/ML
INJECTION, SOLUTION PERINEURAL AS NEEDED
Status: DISCONTINUED | OUTPATIENT
Start: 2025-02-25 | End: 2025-02-25 | Stop reason: HOSPADM

## 2025-02-25 RX ORDER — PHENYLEPHRINE HCL IN 0.9% NACL 1 MG/10 ML
SYRINGE (ML) INTRAVENOUS AS NEEDED
Status: DISCONTINUED | OUTPATIENT
Start: 2025-02-25 | End: 2025-02-25 | Stop reason: SURG

## 2025-02-25 RX ORDER — MEPERIDINE HYDROCHLORIDE 25 MG/ML
12.5 INJECTION INTRAMUSCULAR; INTRAVENOUS; SUBCUTANEOUS
Status: DISCONTINUED | OUTPATIENT
Start: 2025-02-25 | End: 2025-02-25 | Stop reason: HOSPADM

## 2025-02-25 RX ORDER — PROPOFOL 10 MG/ML
INJECTION, EMULSION INTRAVENOUS AS NEEDED
Status: DISCONTINUED | OUTPATIENT
Start: 2025-02-25 | End: 2025-02-25 | Stop reason: SURG

## 2025-02-25 RX ORDER — LIDOCAINE HYDROCHLORIDE 20 MG/ML
INJECTION, SOLUTION EPIDURAL; INFILTRATION; INTRACAUDAL; PERINEURAL AS NEEDED
Status: DISCONTINUED | OUTPATIENT
Start: 2025-02-25 | End: 2025-02-25 | Stop reason: SURG

## 2025-02-25 RX ORDER — EPHEDRINE SULFATE 5 MG/ML
INJECTION INTRAVENOUS AS NEEDED
Status: DISCONTINUED | OUTPATIENT
Start: 2025-02-25 | End: 2025-02-25 | Stop reason: SURG

## 2025-02-25 RX ORDER — ONDANSETRON 2 MG/ML
INJECTION INTRAMUSCULAR; INTRAVENOUS AS NEEDED
Status: DISCONTINUED | OUTPATIENT
Start: 2025-02-25 | End: 2025-02-25 | Stop reason: SURG

## 2025-02-25 RX ORDER — HYDROMORPHONE HYDROCHLORIDE 1 MG/ML
0.5 INJECTION, SOLUTION INTRAMUSCULAR; INTRAVENOUS; SUBCUTANEOUS
Status: DISCONTINUED | OUTPATIENT
Start: 2025-02-25 | End: 2025-02-25 | Stop reason: HOSPADM

## 2025-02-25 RX ORDER — HYDROCODONE BITARTRATE AND ACETAMINOPHEN 10; 325 MG/1; MG/1
1 TABLET ORAL 4 TIMES DAILY PRN
Qty: 12 TABLET | Refills: 0 | Status: SHIPPED | OUTPATIENT
Start: 2025-02-25

## 2025-02-25 RX ORDER — HYDRALAZINE HYDROCHLORIDE 20 MG/ML
10 INJECTION INTRAMUSCULAR; INTRAVENOUS ONCE
Status: COMPLETED | OUTPATIENT
Start: 2025-02-25 | End: 2025-02-25

## 2025-02-25 RX ORDER — FENTANYL CITRATE 50 UG/ML
50 INJECTION, SOLUTION INTRAMUSCULAR; INTRAVENOUS
Status: DISCONTINUED | OUTPATIENT
Start: 2025-02-25 | End: 2025-02-25 | Stop reason: HOSPADM

## 2025-02-25 RX ORDER — SODIUM CHLORIDE 9 MG/ML
40 INJECTION, SOLUTION INTRAVENOUS AS NEEDED
Status: DISCONTINUED | OUTPATIENT
Start: 2025-02-25 | End: 2025-02-25 | Stop reason: HOSPADM

## 2025-02-25 RX ORDER — SODIUM CHLORIDE, SODIUM LACTATE, POTASSIUM CHLORIDE, CALCIUM CHLORIDE 600; 310; 30; 20 MG/100ML; MG/100ML; MG/100ML; MG/100ML
125 INJECTION, SOLUTION INTRAVENOUS ONCE
Status: COMPLETED | OUTPATIENT
Start: 2025-02-25 | End: 2025-02-25

## 2025-02-25 RX ORDER — OXYCODONE AND ACETAMINOPHEN 5; 325 MG/1; MG/1
1 TABLET ORAL ONCE AS NEEDED
Status: COMPLETED | OUTPATIENT
Start: 2025-02-25 | End: 2025-02-25

## 2025-02-25 RX ADMIN — EPHEDRINE SULFATE 10 MG: 5 INJECTION INTRAVENOUS at 08:16

## 2025-02-25 RX ADMIN — FENTANYL CITRATE 50 MCG: 50 INJECTION, SOLUTION INTRAMUSCULAR; INTRAVENOUS at 09:24

## 2025-02-25 RX ADMIN — PROPOFOL 30 MG: 10 INJECTION, EMULSION INTRAVENOUS at 07:44

## 2025-02-25 RX ADMIN — Medication 50 MCG: at 08:10

## 2025-02-25 RX ADMIN — EPHEDRINE SULFATE 5 MG: 5 INJECTION INTRAVENOUS at 08:23

## 2025-02-25 RX ADMIN — EPHEDRINE SULFATE 10 MG: 5 INJECTION INTRAVENOUS at 08:49

## 2025-02-25 RX ADMIN — FENTANYL CITRATE 50 MCG: 50 INJECTION INTRAMUSCULAR; INTRAVENOUS at 08:59

## 2025-02-25 RX ADMIN — FENTANYL CITRATE 25 MCG: 50 INJECTION INTRAMUSCULAR; INTRAVENOUS at 08:28

## 2025-02-25 RX ADMIN — Medication 100 MCG: at 07:39

## 2025-02-25 RX ADMIN — SODIUM CHLORIDE, POTASSIUM CHLORIDE, SODIUM LACTATE AND CALCIUM CHLORIDE: 600; 310; 30; 20 INJECTION, SOLUTION INTRAVENOUS at 07:30

## 2025-02-25 RX ADMIN — CEFAZOLIN 2 G: 2 INJECTION, POWDER, FOR SOLUTION INTRAMUSCULAR; INTRAVENOUS at 07:30

## 2025-02-25 RX ADMIN — ONDANSETRON 4 MG: 2 INJECTION INTRAMUSCULAR; INTRAVENOUS at 07:30

## 2025-02-25 RX ADMIN — Medication 100 MCG: at 08:33

## 2025-02-25 RX ADMIN — SODIUM CHLORIDE, POTASSIUM CHLORIDE, SODIUM LACTATE AND CALCIUM CHLORIDE: 600; 310; 30; 20 INJECTION, SOLUTION INTRAVENOUS at 08:58

## 2025-02-25 RX ADMIN — TECHNETIUM TC 99M SULFUR COLLOID 1 DOSE: KIT at 07:08

## 2025-02-25 RX ADMIN — Medication 100 MCG: at 07:45

## 2025-02-25 RX ADMIN — Medication 100 MCG: at 07:51

## 2025-02-25 RX ADMIN — Medication 50 MCG: at 07:58

## 2025-02-25 RX ADMIN — FAMOTIDINE 20 MG: 10 INJECTION, SOLUTION INTRAVENOUS at 07:30

## 2025-02-25 RX ADMIN — EPHEDRINE SULFATE 5 MG: 5 INJECTION INTRAVENOUS at 08:26

## 2025-02-25 RX ADMIN — HYDRALAZINE HYDROCHLORIDE 10 MG: 20 INJECTION INTRAMUSCULAR; INTRAVENOUS at 09:39

## 2025-02-25 RX ADMIN — ONDANSETRON 4 MG: 2 INJECTION INTRAMUSCULAR; INTRAVENOUS at 09:53

## 2025-02-25 RX ADMIN — EPHEDRINE SULFATE 10 MG: 5 INJECTION INTRAVENOUS at 08:46

## 2025-02-25 RX ADMIN — EPHEDRINE SULFATE 5 MG: 5 INJECTION INTRAVENOUS at 08:19

## 2025-02-25 RX ADMIN — Medication 100 MCG: at 07:48

## 2025-02-25 RX ADMIN — OXYCODONE HYDROCHLORIDE AND ACETAMINOPHEN 1 TABLET: 5; 325 TABLET ORAL at 09:24

## 2025-02-25 RX ADMIN — Medication 100 MCG: at 07:42

## 2025-02-25 RX ADMIN — LIDOCAINE HYDROCHLORIDE 100 MG: 20 INJECTION, SOLUTION EPIDURAL; INFILTRATION; INTRACAUDAL; PERINEURAL at 08:49

## 2025-02-25 RX ADMIN — Medication 50 MCG: at 08:01

## 2025-02-25 RX ADMIN — Medication 50 MCG: at 08:38

## 2025-02-25 RX ADMIN — Medication 50 MCG: at 08:23

## 2025-02-25 RX ADMIN — PROPOFOL 30 MG: 10 INJECTION, EMULSION INTRAVENOUS at 08:26

## 2025-02-25 RX ADMIN — FENTANYL CITRATE 25 MCG: 50 INJECTION INTRAMUSCULAR; INTRAVENOUS at 07:30

## 2025-02-25 RX ADMIN — PROPOFOL 100 MG: 10 INJECTION, EMULSION INTRAVENOUS at 07:36

## 2025-02-25 NOTE — ANESTHESIA PREPROCEDURE EVALUATION
Anesthesia Evaluation     Patient summary reviewed and Nursing notes reviewed   no history of anesthetic complications:   NPO Solid Status: > 8 hours  NPO Liquid Status: > 8 hours           Airway   Mallampati: II  Neck ROM: full  No difficulty expected  Dental - normal exam     Pulmonary - normal exam    breath sounds clear to auscultation  (+) ,sleep apnea (has cpap but doesn't wear)  (-) not a smoker  Cardiovascular - normal exam  Exercise tolerance: good (4-7 METS)    ECG reviewed  PT is on anticoagulation therapy  Rhythm: regular  Rate: normal    (+) hypertension 2 medications or greater, hyperlipidemia,  carotid artery disease    ROS comment: 11/2/20-Normal sinus rhythm  Left axis deviation  Nonspecific ST abnormality  Abnormal ECG 11/2/20 cxr- wnl. 07/28/20 -tte- · LVEF 66 - 70%. Gr1dd, trace tr, pr, no effusions.     Neuro/Psych- negative ROS  GI/Hepatic/Renal/Endo    (+) GERD    Musculoskeletal     Abdominal     Abdomen: soft.   Substance History - negative use     OB/GYN negative ob/gyn ROS         Other   arthritis,   history of cancer (breast) active    ROS/Med Hx Other:   Normal sinus rhythm  Left axis deviation  Moderate voltage criteria for LVH, may be normal variant  Abnormal ECG  When compared with ECG of 02-Nov-2020 16:42,  No significant change was found  Confirmed by Zoila Cheek ([id number]) on 2/22/2025 12:04:10 PM                     Anesthesia Plan    ASA 3     general     (lars)  intravenous induction     Anesthetic plan, risks, benefits, and alternatives have been provided, discussed and informed consent has been obtained with: patient.  Pre-procedure education provided  Use of blood products discussed with  Consented to blood products.    Plan discussed with CRNA.

## 2025-02-25 NOTE — ANESTHESIA PROCEDURE NOTES
Airway  Urgency: elective    Date/Time: 2/25/2025 7:37 AM  Airway not difficult    General Information and Staff    Patient location during procedure: OR  CRNA/CAA: Annamarie Sullivan CRNA    Indications and Patient Condition  Indications for airway management: airway protection    Preoxygenated: yes  MILS maintained throughout  Mask difficulty assessment: 0 - not attempted    Final Airway Details  Final airway type: supraglottic airway      Successful airway: unique  Size 4     Number of attempts at approach: 1  Assessment: lips, teeth, and gum same as pre-op

## 2025-02-27 LAB — REF LAB TEST METHOD: NORMAL

## 2025-03-06 ENCOUNTER — OFFICE VISIT (OUTPATIENT)
Dept: SURGERY | Facility: CLINIC | Age: 80
End: 2025-03-06
Payer: MEDICARE

## 2025-03-06 VITALS
SYSTOLIC BLOOD PRESSURE: 136 MMHG | DIASTOLIC BLOOD PRESSURE: 78 MMHG | WEIGHT: 137 LBS | BODY MASS INDEX: 22.82 KG/M2 | HEART RATE: 64 BPM | HEIGHT: 65 IN

## 2025-03-06 DIAGNOSIS — Z09 POSTOP CHECK: ICD-10-CM

## 2025-03-06 DIAGNOSIS — C50.912 MALIGNANT NEOPLASM OF LEFT BREAST IN FEMALE, ESTROGEN RECEPTOR POSITIVE, UNSPECIFIED SITE OF BREAST: Primary | ICD-10-CM

## 2025-03-06 DIAGNOSIS — Z17.0 MALIGNANT NEOPLASM OF LEFT BREAST IN FEMALE, ESTROGEN RECEPTOR POSITIVE, UNSPECIFIED SITE OF BREAST: Primary | ICD-10-CM

## 2025-03-06 PROCEDURE — 1160F RVW MEDS BY RX/DR IN RCRD: CPT | Performed by: SURGERY

## 2025-03-06 PROCEDURE — 1159F MED LIST DOCD IN RCRD: CPT | Performed by: SURGERY

## 2025-03-06 PROCEDURE — 3075F SYST BP GE 130 - 139MM HG: CPT | Performed by: SURGERY

## 2025-03-06 PROCEDURE — 99024 POSTOP FOLLOW-UP VISIT: CPT | Performed by: SURGERY

## 2025-03-06 PROCEDURE — 3078F DIAST BP <80 MM HG: CPT | Performed by: SURGERY

## 2025-03-06 NOTE — PROGRESS NOTES
"Subjective   Delia Rodriguez is a 79 y.o. female here today for post op.    History of Present Illness  Ms. Rodriguez was seen in the office today for her first postoperative visit following a left lumpectomy and sentinel node biopsy on 2/25/2025.  Final pathology demonstrated a T1c N0 grade 2 invasive ductal carcinoma, ER positive, MN positive, HER2 negative.  Margins of resection are negative.  Patient has some expected soreness related to the surgery but otherwise voices no complaints.  Allergies   Allergen Reactions    Penicillins Hives     Beta lactam allergy details  Antibiotic reaction: hives  Age at reaction: adult  Dose to reaction time: (!) hours  Reason for antibiotic: sore throat  Epinephrine required for reaction?: no  Tolerated antibiotics: unknown             Current Outpatient Medications   Medication Sig Dispense Refill    aspirin 81 MG EC tablet Take 1 tablet by mouth Every 4 (Four) Hours As Needed. STATES HASN'T TAKEN FOR A FEW DAYS      clopidogrel (PLAVIX) 75 MG tablet Take 1 tablet by mouth Daily. 30 tablet 3    HYDROcodone-acetaminophen (NORCO)  MG per tablet Take 1 tablet by mouth 4 (Four) Times a Day As Needed for Moderate Pain. 12 tablet 0    lisinopril (PRINIVIL,ZESTRIL) 10 MG tablet Take 1 tablet by mouth Daily.      simvastatin (ZOCOR) 10 MG tablet Take 1 tablet by mouth Every Other Day.       No current facility-administered medications for this visit.       Objective   /78 (BP Location: Left arm)   Pulse 64   Ht 165.1 cm (65\")   Wt 62.1 kg (137 lb)   BMI 22.80 kg/m²    Physical Exam  Left breast: Healing surgical scar at the lateral 2 o'clock position in the axilla with expected postop change.  Results/Data  Pathology result was reviewed and discussed with the patient    Procedures     Assessment & Plan   Left breast T1c N0 grade 2 invasive ductal carcinoma, ER positive, MN positive, HER2 negative.  Patient likely meets the criteria to avoid radiation unless the " tumor is high risk mammaprint    MammaPrint will be ordered and patient will return to the office in 2 weeks for results and to discuss adjuvant treatment.       Discussion/Summary    BMI is within normal parameters. No other follow-up for BMI required.       Future Appointments   Date Time Provider Department Center   3/19/2025 11:30 AM Lianne Dotson APRN [facility]         This document has been electronically signed by Nadya Lopez MA   March 6, 2025 09:29 EST      Please note that portions of this note were completed with a voice recognition program.

## 2025-03-18 LAB — REF LAB TEST METHOD: NORMAL

## 2025-03-18 NOTE — PROGRESS NOTES
"     [facility] Cardiothoracic Surgery Office Follow Up Note     Date of Encounter: 2025     Name: Delia Rodriguez  : 1945     Referred By: No ref. provider found  PCP: Dustin Cruz MD    Chief Complaint:    Chief Complaint   Patient presents with    Follow-up     1 yr f/u with carotid duplex. Pt states that she is she is recovering from breast cancer sx on the left side. \"I feel like Im doing well but it has been a change since I saw this provider last\".        Subjective      History of Present Illness:    Delia Rodriguez is a 79 y.o. female followed by Dr. Paige for YAMILEX s/p left CEA 2020.  PMH: HTN, hyperlipidemia, prediabetes, and YAMILEX.  Last seen in clinic 3/20/2024 with no neurological symptoms and carotid duplex which demonstrated moderate MELANI stenosis (MELANI  cm/s).  Left carotid system without hemodynamically significant stenosis.  Patient returns for follow-up carotid duplex imaging review.  States compliance with aspirin, Plavix, and statin therapy. Since last visit, patient diagnosed with left breast carcinoma s/p left breast lumpectomy with sentinel node biopsy per Dr. Divya Norwood 2025.      Review of Systems:  Review of Systems   Constitutional: Positive for decreased appetite (somewhat different since breast SX). Negative for chills, fever, malaise/fatigue, night sweats and weight loss.   HENT:  Negative for congestion, hearing loss, nosebleeds and odynophagia.    Cardiovascular:  Negative for chest pain, claudication, dyspnea on exertion, leg swelling, orthopnea, palpitations and syncope.   Respiratory:  Negative for cough, hemoptysis, shortness of breath and wheezing.    Endocrine: Negative for cold intolerance, heat intolerance, polydipsia, polyphagia and polyuria.   Hematologic/Lymphatic: Positive for bleeding problem. Bruises/bleeds easily.   Skin:  Negative for itching, poor wound healing and rash.   Musculoskeletal:  Positive for muscle " cramps (RLS sometimes) and neck pain (some neck issues). Negative for arthritis, back pain, joint pain, joint swelling and myalgias.   Gastrointestinal:  Positive for bowel incontinence (IBS) and constipation (IBS). Negative for abdominal pain, diarrhea, hematemesis, melena, nausea and vomiting.   Genitourinary:  Negative for dysuria, frequency, hematuria, nocturia and urgency.   Neurological:  Negative for dizziness, light-headedness, loss of balance and numbness.   Psychiatric/Behavioral:  Negative for depression and suicidal ideas. The patient has insomnia (50/50). The patient is not nervous/anxious.    Allergic/Immunologic: Positive for environmental allergies. Negative for HIV exposure.       I have reviewed the following portions of the patient's history: problem list, current medications, allergies, past surgical history, past medical history, past social history, past family history, and ROS and confirm it's accurate.    Allergies:  Allergies   Allergen Reactions    Penicillins Hives     Beta lactam allergy details  Antibiotic reaction: hives  Age at reaction: adult  Dose to reaction time: (!) hours  Reason for antibiotic: sore throat  Epinephrine required for reaction?: no  Tolerated antibiotics: unknown           Medications:      Current Outpatient Medications:     aspirin 81 MG EC tablet, Take 1 tablet by mouth Every 4 (Four) Hours As Needed. STATES HASN'T TAKEN FOR A FEW DAYS, Disp: , Rfl:     clopidogrel (PLAVIX) 75 MG tablet, Take 1 tablet by mouth Daily., Disp: 30 tablet, Rfl: 3    lisinopril (PRINIVIL,ZESTRIL) 10 MG tablet, Take 1 tablet by mouth Daily., Disp: , Rfl:     simvastatin (ZOCOR) 10 MG tablet, Take 1 tablet by mouth Every Other Day., Disp: , Rfl:     HYDROcodone-acetaminophen (NORCO)  MG per tablet, Take 1 tablet by mouth 4 (Four) Times a Day As Needed for Moderate Pain. (Patient not taking: Reported on 3/19/2025), Disp: 12 tablet, Rfl: 0    History:   Past Medical History:  "  Diagnosis Date    Arthritis     Carotid stenosis     Coronary artery disease     Elevated cholesterol     GERD (gastroesophageal reflux disease)     Hyperlipidemia     Hypertension     IBS (irritable bowel syndrome)     Pre-diabetes     Sleep apnea     Thyroid nodule 3/19/2025       Past Surgical History:   Procedure Laterality Date    ABDOMINAL SURGERY      BREAST LUMPECTOMY WITH SENTINEL NODE BIOPSY AND AXILLARY NODE DISSECTION Left 2/25/2025    Procedure: BREAST LUMPECTOMY WITH SENTINEL NODE BIOPSY, left breast;  Surgeon: Divya Norwood MD;  Location: [facility] OR;  Service: General;  Laterality: Left;    BREAST SURGERY      right breast biopsy    CAROTID ENDARTERECTOMY Left 11/05/2020    Procedure: CAROTID ENDARTERECTOMY WITH EEG LEFT;  Surgeon: Gerardo Paige MD;  Location:  [facility] OR;  Service: Vascular;  Laterality: Left;    CATARACT EXTRACTION, BILATERAL      can't recall which eye for sure    COLONOSCOPY      ENDOSCOPY      EYE SURGERY      GALLBLADDER SURGERY      HYSTERECTOMY         Social History     Socioeconomic History    Marital status:     Number of children: 3   Tobacco Use    Smoking status: Never    Smokeless tobacco: Never   Vaping Use    Vaping status: Never Used   Substance and Sexual Activity    Alcohol use: Not Currently    Drug use: Never    Sexual activity: Defer     Partners: Male     Birth control/protection: Hysterectomy        Family History   Problem Relation Age of Onset    Cancer Mother         kidney    Heart disease Mother     Hypertension Mother     Thyroid cancer Mother     Kidney cancer Mother     Hypertension Father        Objective   Physical Exam:  Vitals:    03/19/25 1142 03/19/25 1143   BP: 132/74 136/78   BP Location: Left arm Right arm   Pulse: 99    Temp: 98.4 °F (36.9 °C)    SpO2: 99%    Weight: 61.8 kg (136 lb 4.8 oz)    Height: 98.4 cm (38.74\")       Body mass index is 63.85 kg/m².    Physical Exam  Vitals reviewed.   Constitutional:       General: She is " not in acute distress.     Appearance: She is not toxic-appearing.   HENT:      Head: Normocephalic and atraumatic.   Eyes:      General: Lids are normal.      Conjunctiva/sclera: Conjunctivae normal.      Pupils: Pupils are equal, round, and reactive to light.   Neck:      Vascular: No carotid bruit.   Cardiovascular:      Rate and Rhythm: Normal rate and regular rhythm.      Heart sounds: S1 normal and S2 normal. No murmur heard.  Pulmonary:      Effort: Pulmonary effort is normal. No respiratory distress.      Breath sounds: No decreased breath sounds, wheezing, rhonchi or rales.   Musculoskeletal:         General: Normal range of motion.      Cervical back: Normal range of motion and neck supple.      Right lower leg: No edema.      Left lower leg: No edema.   Lymphadenopathy:      Cervical: No cervical adenopathy.      Upper Body:      Right upper body: No supraclavicular adenopathy.      Left upper body: No supraclavicular adenopathy.   Skin:     General: Skin is warm and dry.      Capillary Refill: Capillary refill takes less than 2 seconds.   Neurological:      General: No focal deficit present.      Mental Status: She is alert and oriented to person, place, and time.   Psychiatric:         Attention and Perception: Attention normal.         Mood and Affect: Mood normal.         Speech: Speech normal.         Behavior: Behavior normal. Behavior is cooperative.         Imaging/Labs:  US Carotid Bilateral: 2/24/2025  RIGHT:  Extensive plaque right carotid system. No occlusion.  RIGHT CCA PSV: 93 cm/s  RIGHT ICA PSV: 166 cm/s  RIGHT ICA EDV: 40 cm/s  Right ICA/CCA Ratio: 1.8  Anterograde flow is demonstrated in RIGHT vertebral artery.   LEFT:  Mild plaque. No occlusion.  LEFT CCA PSV: 101 cm/s  LEFT ICA PSV: 113 cm/s  LEFT EDV: 33 cm/s  Left ICA/CCA Ratio: 1.1  Anterograde flow is demonstrated in LEFT vertebral artery.   Mixed cystic and solid nodule right lobe thyroid is 1.55 cm. Follow-up with thyroid  ultrasound. Hypoechoic nodule left lobe thyroid is 1.07 cm. Follow-up with thyroid ultrasound.  Impression:  1. Extensive plaque right carotid system and mild plaque left carotid system. No occlusion.   2. 50 to 69% stenosis right ICA. Recommend CTA or contrast MRA.   3. No hemodynamically significant stenosis left ICA. 4. Antegrade flow noted both vertebral arteries.    This report was finalized on 2/24/2025 11:03 AM by Dr. Bryan Vázquez MD.       US Carotid Bilateral: 3/16/2024  (R ICA  cm/s and LICA PSV 75 cm/s)  Impression:  Result suggestive of at least moderate stenosis in the right carotid system. Recommend correlation with CTA and/or vascular follow-up   This report was finalized on 3/16/2024 11:00 AM by Dr. Cornelio Lopez MD.         Carotid Duplex -- 12/22/2022  (R ICA  cm/s and LICA  cm/s)  Impression:  No evidence of hemodynamically significant plaques or stenosis within  the carotid system at this time.  This report was finalized on 12/22/2022 1:25 PM by Dr. Cornelio Lopez MD.     US Carotid Bilateral-Result Date: 12/10/2021  Impression:  No evidence of hemodynamically significant plaques or stenosis within the carotid system at this time.  This report was finalized on 12/10/2021 2:41 PM by Dr. Cornelio Lopez MD.        Assessment / Plan      Assessment / Plan:  1. Stenosis of left carotid artery s/p LEFT CEA 11/2020   2. Thyroid nodule  - 79 y.o. female followed by Dr. Paige for YAMILEX s/p left CEA 11/20/2020.    - PMH: HTN, hyperlipidemia, prediabetes, left breast cancer s/p lumpectomy 2/2025, and YAMILEX.    - Last seen in clinic 3/20/2024: no neurological symptoms + carotid duplex which demonstrated moderate MELANI stenosis (MELANI  cm/s).    - Follow-up carotid duplex 2/24/2025:  Extensive plaque right carotid system and mild plaque left carotid system. No occlusion. 50 to 69% stenosis right ICA. Left carotid system without hemodynamically significant stenosis.    - Remains  compliant with aspirin, Plavix, and statin therapy.   - No CVA/TIA/amaurosis fugax symptoms  -Refer to PCP, Dr. Dustin Cruz, for further evaluation of incidental notation right thyroid nodule on carotid duplex  - Return to Vascular Surgery in one year with follow up carotid duplex.    Patient Education: Report any neurological symptoms promptly or call 911.  Be FAST: Balance issues, Eyesight loss, Face drooping, Arm weakness, Speech changes, Time to get help       Follow Up:   Return in about 1 year (around 3/19/2026) for Carotid duplex.   Or sooner for any further concerns or worsening sign and symptoms. If unable to reach us in the office please dial 911 or go to the nearest emergency department.      JUVENCIO Cox  [facility] Cardiothoracic Surgery    Time Spent: I spent 22 minutes caring for Delia on this date of service. This time includes time spent by me in the following activities: preparing for the visit, reviewing tests, obtaining and/or reviewing a separately obtained history, performing a medically appropriate examination and/or evaluation, counseling and educating the patient/family/caregiver, referring and communicating with other health care professionals, documenting information in the medical record, and care coordination.

## 2025-03-19 ENCOUNTER — OFFICE VISIT (OUTPATIENT)
Dept: CARDIAC SURGERY | Facility: CLINIC | Age: 80
End: 2025-03-19
Payer: MEDICARE

## 2025-03-19 VITALS
HEART RATE: 99 BPM | WEIGHT: 136.3 LBS | BODY MASS INDEX: 31.54 KG/M2 | TEMPERATURE: 98.4 F | HEIGHT: 55 IN | DIASTOLIC BLOOD PRESSURE: 78 MMHG | SYSTOLIC BLOOD PRESSURE: 136 MMHG | OXYGEN SATURATION: 99 %

## 2025-03-19 DIAGNOSIS — I65.22 STENOSIS OF LEFT CAROTID ARTERY: Primary | ICD-10-CM

## 2025-03-19 DIAGNOSIS — E04.1 THYROID NODULE: ICD-10-CM

## 2025-03-20 ENCOUNTER — TELEPHONE (OUTPATIENT)
Dept: SURGERY | Facility: CLINIC | Age: 80
End: 2025-03-20

## 2025-03-20 NOTE — TELEPHONE ENCOUNTER
Caller: Jalil Rodriguez    Relationship:  Emergency Contact    Best call back number: [phone]     PATIENT CALLED REQUESTING TO CANCEL SAME DAY APPT.    Did the patient call AFTER the start time of their scheduled appointment?  []YES  [x]NO    Was the patient's appointment rescheduled? []YES  [x]NO    Any additional information: SAME DAY APPT CANCEL

## 2025-03-27 ENCOUNTER — OFFICE VISIT (OUTPATIENT)
Dept: SURGERY | Facility: CLINIC | Age: 80
End: 2025-03-27
Payer: MEDICARE

## 2025-03-27 ENCOUNTER — PATIENT OUTREACH (OUTPATIENT)
Dept: ONCOLOGY | Facility: CLINIC | Age: 80
End: 2025-03-27
Payer: MEDICARE

## 2025-03-27 VITALS
WEIGHT: 136.6 LBS | SYSTOLIC BLOOD PRESSURE: 126 MMHG | HEART RATE: 74 BPM | DIASTOLIC BLOOD PRESSURE: 78 MMHG | HEIGHT: 65 IN | BODY MASS INDEX: 22.76 KG/M2

## 2025-03-27 DIAGNOSIS — Z17.0 MALIGNANT NEOPLASM OF LEFT BREAST IN FEMALE, ESTROGEN RECEPTOR POSITIVE, UNSPECIFIED SITE OF BREAST: Primary | ICD-10-CM

## 2025-03-27 DIAGNOSIS — E04.1 THYROID NODULE: ICD-10-CM

## 2025-03-27 DIAGNOSIS — C50.912 MALIGNANT NEOPLASM OF LEFT BREAST IN FEMALE, ESTROGEN RECEPTOR POSITIVE, UNSPECIFIED SITE OF BREAST: Primary | ICD-10-CM

## 2025-03-27 PROCEDURE — 99024 POSTOP FOLLOW-UP VISIT: CPT | Performed by: SURGERY

## 2025-03-27 PROCEDURE — 1160F RVW MEDS BY RX/DR IN RCRD: CPT | Performed by: SURGERY

## 2025-03-27 PROCEDURE — 1159F MED LIST DOCD IN RCRD: CPT | Performed by: SURGERY

## 2025-03-27 PROCEDURE — 3074F SYST BP LT 130 MM HG: CPT | Performed by: SURGERY

## 2025-03-27 PROCEDURE — 3078F DIAST BP <80 MM HG: CPT | Performed by: SURGERY

## 2025-03-27 RX ORDER — METHOCARBAMOL 500 MG/1
500 TABLET, FILM COATED ORAL
COMMUNITY
Start: 2025-03-25

## 2025-03-27 NOTE — PROGRESS NOTES
"Subjective   Delia Rodriguez is a 79 y.o. female here today for test results.    History of Present Illness  Ms. Rodriguez was seen in the office today for a postoperative visit following a left lumpectomy and sentinel node biopsy on 2/25/2025. 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 Margins of resection are negative.   She returns today for wound check as well as to discuss the results of her MammaPrint which did return high risk luminal B  Allergies   Allergen Reactions    Penicillins Hives     Beta lactam allergy details  Antibiotic reaction: hives  Age at reaction: adult  Dose to reaction time: (!) hours  Reason for antibiotic: sore throat  Epinephrine required for reaction?: no  Tolerated antibiotics: unknown             Current Outpatient Medications   Medication Sig Dispense Refill    aspirin 81 MG EC tablet Take 1 tablet by mouth Every 4 (Four) Hours As Needed. STATES HASN'T TAKEN FOR A FEW DAYS      clopidogrel (PLAVIX) 75 MG tablet Take 1 tablet by mouth Daily. 30 tablet 3    lisinopril (PRINIVIL,ZESTRIL) 10 MG tablet Take 1 tablet by mouth Daily.      methocarbamol (ROBAXIN) 500 MG tablet Take 1 tablet by mouth.      simvastatin (ZOCOR) 10 MG tablet Take 1 tablet by mouth Every Other Day.       No current facility-administered medications for this visit.       Objective   /78 (BP Location: Left arm)   Pulse 74   Ht 165.1 cm (65\")   Wt 62 kg (136 lb 9.6 oz)   BMI 22.73 kg/m²    Physical Exam  Well-developed well-nourished female  Left breast: Healing surgical scars in the lateral left breast in the axilla with minimal postop change    Results/Data  MammaPrint result was reviewed and discussed at length including potential benefit of the addition of chemotherapy to endocrine therapy.    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 "   Thyroid nodule detected on carotid ultrasound    Plan: Given that the patient was high risk mammaprint discussed adjuvant radiation which patient is willing to have.  She is not willing to have chemotherapy and does not wish medical oncology consultation to discuss this.  Referral to radiation  Follow-up with me in 2-1/2 months to start estrogen blocker  Formal thyroid ultrasound will be ordered and the results will be tracked       Discussion/Summary    BMI is within normal parameters. No other follow-up for BMI required.       Future Appointments   Date Time Provider Department Center   3/18/2026 10:00 AM Lianne Dotson APRN [facility]         This document has been electronically signed by Nadya Lopez MA   March 27, 2025 09:23 EDT      Please note that portions of this note were completed with a voice recognition program.

## 2025-03-27 NOTE — SIGNIFICANT NOTE
Met with patient during a follow up with Dr. Norwood. Treatment options were discussed, patient decided with radiation and hormone therapy. NN addressed understanding of plan of care, patient's next steps, and provided clarification as needed. NN contact information was provided and encouraged patient to call with any questions or concerns. Pt. Verbalized understanding and is in agreement with plan of care. NN will follow and assist PRN

## 2025-04-07 ENCOUNTER — CONSULT (OUTPATIENT)
Dept: RADIATION ONCOLOGY | Facility: HOSPITAL | Age: 80
End: 2025-04-07
Payer: MEDICARE

## 2025-04-07 ENCOUNTER — EDUCATION (OUTPATIENT)
Dept: RADIATION ONCOLOGY | Facility: HOSPITAL | Age: 80
End: 2025-04-07
Payer: MEDICARE

## 2025-04-07 VITALS
RESPIRATION RATE: 17 BRPM | HEART RATE: 93 BPM | DIASTOLIC BLOOD PRESSURE: 76 MMHG | SYSTOLIC BLOOD PRESSURE: 138 MMHG | WEIGHT: 138 LBS | TEMPERATURE: 98 F | BODY MASS INDEX: 22.96 KG/M2 | OXYGEN SATURATION: 99 %

## 2025-04-07 DIAGNOSIS — Z17.0 MALIGNANT NEOPLASM OF UPPER-OUTER QUADRANT OF LEFT BREAST IN FEMALE, ESTROGEN RECEPTOR POSITIVE: Primary | ICD-10-CM

## 2025-04-07 DIAGNOSIS — C50.412 MALIGNANT NEOPLASM OF UPPER-OUTER QUADRANT OF LEFT BREAST IN FEMALE, ESTROGEN RECEPTOR POSITIVE: Primary | ICD-10-CM

## 2025-04-07 PROCEDURE — 77263 THER RADIOLOGY TX PLNG CPLX: CPT | Performed by: RADIOLOGY

## 2025-04-07 PROCEDURE — G0463 HOSPITAL OUTPT CLINIC VISIT: HCPCS | Performed by: RADIOLOGY

## 2025-04-07 NOTE — PROGRESS NOTES
RN completed [facility] Education with patient today, to prepare for CT Simulation and Radiation Treatments, scheduled for 4-14-25. Pt and  watched video and practiced with module. RN provided pt with thorough paper instructions for practicing Breath Hold at home. RN instructed the importance of practicing 4+ times daily beginning today until completion of treatment. Pt was provided with [employer] along with instructions on use. Pt expressed clear understanding with no questions at this time.

## 2025-04-07 NOTE — PROGRESS NOTES
New Patient Visit       Patient: Delia Rodriguez   YOB: 1945   Medical Record Number: [id number]   Date of Visit  April 7, 2025   Primary Diagnosis: BHONCHLOÉTAGE@ Malignant neoplasm of upper-outer quadrant of left breast in female, estrogen receptor positive [C50.412, Z17.0]                                              History of Present Illness: Mrs. 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    6/20/2024 when she underwent a bilateral screening mammogram.  Prior to the mammogram the patient had noticed no changes within her breast.  Specifically she had noticed no palpable lesions adenopathy nipple discharge or skin changes.  The screening mammogram however revealed a suspicious lesion within the posterior left breast.  On 1/8/2025 a diagnostic mammogram with ultrasound revealed a mixed echogenic lesion in the upper outer portion of her left breast which measured 1.9 cm.  This was felt to be suspicious.  On 1/15/2025 the patient underwent a fine-needle aspiration.  This was cytologically suspicious for carcinoma.    On 2/10/2025 the patient had a bilateral breast MRI.  This showed the following impressions:1. Benign right breast MRI findings.   2. Biopsy-proven malignancy in the posterior left 3:00 region measuring  2.0 cm in size. There are no additional worrisome findings in the left  breast.    On 2/25/2025 the patient underwent a left lumpectomy and sentinel lymph node resection.  Pathology revealed a pT1c grade 2 infiltrating ductal carcinoma that was ER positive NC positive HER2 negative.  Margins were negative.  MammaPrint however revealed a high risk luminal type.    On 3/27/2025 the patient had a postoperative visit with Dr. Norwood.  She discussed her high risk MammaPrint.  The patient adamantly refuses chemotherapy.  She  did however except breast conserving radiation therapy with the potential of hormonal treatment to follow.    The patient is seen today.  It has been 6 weeks since her lumpectomy.  She is well-healed and has had no local problems.  She has noticed no adenopathy visceromegaly or skeletal tenderness.  Her appetite is healthy weight stable performance level high.  She just noticed no adenopathy visceromegaly or skeletal tenderness.     Review of Systems    All other systems reviewed and are negative.    Vitals:    04/07/25 1030   BP: 138/76   Pulse: 93   Resp: 17   Temp: 98 °F (36.7 °C)   SpO2: 99%     Past Medical History:   Diagnosis Date    Arthritis     Carotid stenosis     Coronary artery disease     Elevated cholesterol     GERD (gastroesophageal reflux disease)     Hyperlipidemia     Hypertension     IBS (irritable bowel syndrome)     Pre-diabetes     Sleep apnea     Thyroid nodule 3/19/2025        Past Surgical History:   Procedure Laterality Date    ABDOMINAL SURGERY      BREAST LUMPECTOMY WITH SENTINEL NODE BIOPSY AND AXILLARY NODE DISSECTION Left 2/25/2025    Procedure: BREAST LUMPECTOMY WITH SENTINEL NODE BIOPSY, left breast;  Surgeon: Divya Norwood MD;  Location: [facility] OR;  Service: General;  Laterality: Left;    BREAST SURGERY      right breast biopsy    CAROTID ENDARTERECTOMY Left 11/05/2020    Procedure: CAROTID ENDARTERECTOMY WITH EEG LEFT;  Surgeon: Gerardo Paige MD;  Location: [facility] OR;  Service: Vascular;  Laterality: Left;    CATARACT EXTRACTION, BILATERAL      can't recall which eye for sure    COLONOSCOPY      ENDOSCOPY      EYE SURGERY      GALLBLADDER SURGERY      HYSTERECTOMY        Family History   Problem Relation Age of Onset    Cancer Mother         kidney    Heart disease Mother     Hypertension Mother     Thyroid cancer Mother     Kidney cancer Mother     Hypertension Father     Pancreatic cancer Maternal Aunt         Social History     Socioeconomic History    Marital status:      Number of children: 3   Tobacco Use    Smoking status: Never    Smokeless tobacco: Never   Vaping Use    Vaping status: Never Used   Substance and Sexual Activity    Alcohol use: Not Currently    Drug use: Never    Sexual activity: Defer     Partners: Male     Birth control/protection: Hysterectomy          Allergies:  Penicillins   Prior to Admission medications    Medication Sig Start Date End Date Taking? Authorizing Provider   clopidogrel (PLAVIX) 75 MG tablet Take 1 tablet by mouth Daily. 11/6/20  Yes Hari Gray PA   lisinopril (PRINIVIL,ZESTRIL) 10 MG tablet Take 1 tablet by mouth Daily.   Yes Shagufta Del Toro MD   simvastatin (ZOCOR) 10 MG tablet Take 1 tablet by mouth Every Other Day. 4/8/15  Yes Shagufta Del Toro MD   aspirin 81 MG EC tablet Take 1 tablet by mouth Every 4 (Four) Hours As Needed. STATES HASN'T TAKEN FOR A FEW DAYS  Patient not taking: Reported on 4/7/2025    Shagufta Del Toro MD   methocarbamol (ROBAXIN) 500 MG tablet Take 1 tablet by mouth.  Patient not taking: Reported on 4/7/2025 3/25/25   Shagufta Del Toro MD      Pain:(on a scale of 0-10)    There were no vitals filed for this visit.     Delia Rodriguez reports a pain score of  0.  Given her pain assessment as noted, treatment options were discussed and the following options were decided upon as a follow-up plan to address the patient's pain: continuation of current treatment plan for pain.       Quality of Life:   ECOG: (0) Fully active, able to carry on all predisease performance without restriction      Physical Examination:  Vitals:  VITALS@    Height:    Weight: Weight: 62.6 kg (138 lb)   Body mass index is 22.96 kg/m².    Physical Exam  Constitutional: The patient is a well-developed, well-nourished 79-year-old female in no acute distress.  Alert and oriented ×3.  HEENT: Atraumatic. Normocephalic. No abnormalities noted.  EOMI.  PERRLA.  No icterus.  Lymphatics: No cervical, supraclavicular,  or axillary lymphadenopathy is palpated.  Her left axilla is well-healed.  CV: Regular rate and rhythm.  No murmurs, rubs, or gallops are appreciated.  Respiratory: Lungs clear to auscultation.  Breath sounds equal bilaterally.  Breasts: Surgical scars in the left breast and axilla are well healed, and there are no suspicious lesions or nodules palpated. The right breast is within normal limits, with no suspicious lesions or nodules palpated.  GI: Abdomen soft, nontender, nondistended, with no hepatosplenomegaly or masses palpated.  Extremities: No clubbing, cyanosis, or edema.  The patient has good range of motion of her left arm.  Neurologic: Cranial nerves II through XII are grossly intact, with no focal neurological deficits noted on exam.  Psychiatric: Alert and oriented x3. Normal affect, with no anxiety or depression noted.    Radiographs : US Carotid Bilateral  Result Date: 2/24/2025  1. Extensive plaque right carotid system and mild plaque left carotid system. No occlusion. 2. 50 to 69% stenosis right ICA. Recommend CTA or contrast MRA. 3. No hemodynamically significant stenosis left ICA. 4. Antegrade flow noted both vertebral arteries.  This report was finalized on 2/24/2025 11:03 AM by Dr. Bryan Vázquez MD.      MRI Breast Bilateral Diagnostic W WO Contrast  Result Date: 2/18/2025   1. Benign right breast MRI findings.  2. Biopsy-proven malignancy in the posterior left 3:00 region measuring 2.0 cm in size. There are no additional worrisome findings in the left breast.  RECOMMENDATIONS: Recommend surgical follow-up.  BI-RADS CATEGORY: 6, KNOWN BIOPSY PROVEN MALIGNANCY    This report was finalized on 2/18/2025 9:58 AM by Dr. Sravanthi Gutierrez MD.      US Fine Needle Aspiration BX 1st Lesion  Addendum Date: 1/21/2025  PATHOLOGY:  Left breast FNA: Suspicious.  Cellular specimen with single cells and some groups with mild to moderate atypia in a clean background.  Recommend surgical consultation for excisional  biopsy.  The patient will be notified of the results and recommendations by our breast care nurse.  This report was finalized on 1/21/2025 9:13 AM by Dr. Sravanthi Gutierrez MD.      Result Date: 1/21/2025  Fine needle aspiration of a left 3:00 intramammary lymph node.   Pathology results are pending.  This report was finalized on 1/16/2025 11:00 AM by Dr. Sravanthi Gutierrez MD.      Mammo Diagnostic Digital Tomosynthesis Left With CAD  Result Date: 1/8/2025   1. Benign right breast imaging findings.  2. 1.9 cm mass in the posterior left 3:00 region which may represent 2 adjacent abnormal-appearing lymph nodes. Recommend ultrasound-guided fine-needle aspiration   BI-RADS CATEGORY: 4, SUSPICIOUS   RECOMMENDATION: Recommend ultrasound-guided fine-needle aspiration of the left breast.  CAD was utilized.  The standard false-negative rate of mammography is between 10% and 25%. Complex patterns or increased breast density will markedly elevate the false-negative rate of mammography.    The patient was notified of the results and recommendations at the time of her visit.  Additionally, a letter, in lay terminology, with the results of this exam will be mailed to the patient.  This report was finalized on 1/8/2025 2:43 PM by Dr. Sravanthi Gutierrez MD.      US Breast Bilateral Limited  Result Date: 1/8/2025   1. Benign right breast imaging findings.  2. 1.9 cm mass in the posterior left 3:00 region which may represent 2 adjacent abnormal-appearing lymph nodes. Recommend ultrasound-guided fine-needle aspiration   BI-RADS CATEGORY: 4, SUSPICIOUS   RECOMMENDATION: Recommend ultrasound-guided fine-needle aspiration of the left breast.  CAD was utilized.  The standard false-negative rate of mammography is between 10% and 25%. Complex patterns or increased breast density will markedly elevate the false-negative rate of mammography.    The patient was notified of the results and recommendations at the time of her visit.  Additionally, a  "letter, in lay terminology, with the results of this exam will be mailed to the patient.  This report was finalized on 1/8/2025 2:43 PM by Dr. Sravanthi Gutierrez MD.      Pathology: Moderately differentiated infiltrating ductal carcinoma, ER positive AK positive HER2 negative, high risk luminal type MammaPrint.  Labs:   Lab Results   Component Value Date    GLUCOSE 116 (H) 02/21/2025    BUN 12 02/21/2025    CREATININE 0.83 02/21/2025    EGFRIFNONA 125 11/06/2020    BCR 14.5 02/21/2025    K 4.2 02/21/2025    CO2 26.8 02/21/2025    CALCIUM 9.2 02/21/2025    ALBUMIN 3.9 02/21/2025    AST 16 02/21/2025    ALT 9 02/21/2025       WBC   Date Value Ref Range Status   02/21/2025 10.41 3.40 - 10.80 10*3/mm3 Final     Hemoglobin   Date Value Ref Range Status   02/21/2025 14.4 12.0 - 15.9 g/dL Final     Hematocrit   Date Value Ref Range Status   02/21/2025 46.3 34.0 - 46.6 % Final     Platelets   Date Value Ref Range Status   02/21/2025 335 140 - 450 10*3/mm3 Final    No results found for: \"PSA\" No results found for: \"CEA\"       Total time spent: 45 minutes of time was spent evaluating the patient's medical records, radiographs, pathology reports, performing a physical examination, discussing radiation therapy and documenting in her electronic medical record.      ASSESSMENT/PLAN: Stage pT1c, pN0 (sn), CM0, moderately differentiated infiltrating ductal carcinoma of the left breast status postlumpectomy and sentinel lymph node surgery.  I believe the patient would be an excellent candidate for breast conserving radiation therapy.  The goals of treatment, steps involved, acute and late toxicities were discussed in detail with the patient and her .    I would plan on treating the involved left breast using a deep inspiration breath-hold technique.  I would treat the entire breast to a total of 4256 cGy in 16 equal fractions.  This would be followed by a boost to the tumor bed of an additional 1000 cGy in 5 fractions.    The " patient is given instructions and DIBH.  Her simulation will follow the completion of her breath-hold training.    Sincerely,        This document has been electronically signed by Gigi Portillo MD   April 7, 2025 11:06 EDT    Dictated Utilizing Dragon Dictation: Part of this note may be an electronic transcription/translation of spoken language to printed text using the Dragon Dictation System.

## 2025-04-07 NOTE — PROGRESS NOTES
[unfilled]      Breast Cancer Risk Factors (Radiation Oncology)    Family History:    1) Breast Cancer/Surgeries:    Age of Diagnosis:79    2) Endometrial Cancer:    Age of Diagnosis:    3) Ovarian Cancer:    Age of Diagnosis:      GYN History:    1) Age of Menarche: 11    2) Age of 1st Pregnancy:17    3) Number of Pregnancies:4    4) Number of Deliveries: 4    5) Breast Fed/Feeding?yes    6) Birth Control Pills:negative    7) Hormone Replacement Therapy:negative

## 2025-04-14 ENCOUNTER — HOSPITAL ENCOUNTER (OUTPATIENT)
Dept: RADIATION ONCOLOGY | Facility: HOSPITAL | Age: 80
Discharge: HOME | End: 2025-04-14
Payer: MEDICARE

## 2025-04-14 ENCOUNTER — HOSPITAL ENCOUNTER (OUTPATIENT)
Dept: RADIATION ONCOLOGY | Facility: HOSPITAL | Age: 80
Setting detail: RADIATION/ONCOLOGY SERIES
End: 2025-04-14
Payer: MEDICARE

## 2025-04-14 PROCEDURE — 77332 RADIATION TREATMENT AID(S): CPT | Performed by: RADIOLOGY

## 2025-04-14 PROCEDURE — 77334 RADIATION TREATMENT AID(S): CPT | Performed by: RADIOLOGY

## 2025-04-18 PROCEDURE — 77334 RADIATION TREATMENT AID(S): CPT | Performed by: RADIOLOGY

## 2025-04-18 PROCEDURE — 77301 RADIOTHERAPY DOSE PLAN IMRT: CPT | Performed by: RADIOLOGY

## 2025-04-18 PROCEDURE — 77338 DESIGN MLC DEVICE FOR IMRT: CPT | Performed by: RADIOLOGY

## 2025-04-18 PROCEDURE — 77300 RADIATION THERAPY DOSE PLAN: CPT | Performed by: RADIOLOGY

## 2025-04-29 ENCOUNTER — HOSPITAL ENCOUNTER (OUTPATIENT)
Dept: RADIATION ONCOLOGY | Facility: HOSPITAL | Age: 80
Discharge: HOME | End: 2025-04-29

## 2025-04-29 LAB
RAD ONC ARIA COURSE ID: NORMAL
RAD ONC ARIA COURSE INTENT: NORMAL
RAD ONC ARIA COURSE LAST TREATMENT DATE: NORMAL
RAD ONC ARIA COURSE START DATE: NORMAL
RAD ONC ARIA COURSE TREATMENT ELAPSED DAYS: 0
RAD ONC ARIA FIRST TREATMENT DATE: NORMAL
RAD ONC ARIA PLAN FRACTIONS TREATED TO DATE: 1
RAD ONC ARIA PLAN ID: NORMAL
RAD ONC ARIA PLAN PRESCRIBED DOSE PER FRACTION: 2.66 GY
RAD ONC ARIA PLAN PRIMARY REFERENCE POINT: NORMAL
RAD ONC ARIA PLAN TOTAL FRACTIONS PRESCRIBED: 16
RAD ONC ARIA PLAN TOTAL PRESCRIBED DOSE: 4256 CGY
RAD ONC ARIA REFERENCE POINT DOSAGE GIVEN TO DATE: 2.66 GY
RAD ONC ARIA REFERENCE POINT ID: NORMAL
RAD ONC ARIA REFERENCE POINT SESSION DOSAGE GIVEN: 2.66 GY

## 2025-04-29 PROCEDURE — 77385: CPT | Performed by: RADIOLOGY

## 2025-04-29 PROCEDURE — 77336 RADIATION PHYSICS CONSULT: CPT | Performed by: RADIOLOGY

## 2025-04-30 ENCOUNTER — HOSPITAL ENCOUNTER (OUTPATIENT)
Dept: RADIATION ONCOLOGY | Facility: HOSPITAL | Age: 80
Discharge: HOME | End: 2025-04-30

## 2025-04-30 VITALS
DIASTOLIC BLOOD PRESSURE: 90 MMHG | HEART RATE: 71 BPM | RESPIRATION RATE: 16 BRPM | OXYGEN SATURATION: 97 % | WEIGHT: 136.7 LBS | BODY MASS INDEX: 22.75 KG/M2 | TEMPERATURE: 97.3 F | SYSTOLIC BLOOD PRESSURE: 169 MMHG

## 2025-04-30 DIAGNOSIS — Z17.0 MALIGNANT NEOPLASM OF UPPER-OUTER QUADRANT OF LEFT BREAST IN FEMALE, ESTROGEN RECEPTOR POSITIVE: Primary | ICD-10-CM

## 2025-04-30 DIAGNOSIS — C50.412 MALIGNANT NEOPLASM OF UPPER-OUTER QUADRANT OF LEFT BREAST IN FEMALE, ESTROGEN RECEPTOR POSITIVE: Primary | ICD-10-CM

## 2025-04-30 LAB
RAD ONC ARIA COURSE ID: NORMAL
RAD ONC ARIA COURSE INTENT: NORMAL
RAD ONC ARIA COURSE LAST TREATMENT DATE: NORMAL
RAD ONC ARIA COURSE START DATE: NORMAL
RAD ONC ARIA COURSE TREATMENT ELAPSED DAYS: 1
RAD ONC ARIA FIRST TREATMENT DATE: NORMAL
RAD ONC ARIA PLAN FRACTIONS TREATED TO DATE: 2
RAD ONC ARIA PLAN ID: NORMAL
RAD ONC ARIA PLAN PRESCRIBED DOSE PER FRACTION: 2.66 GY
RAD ONC ARIA PLAN PRIMARY REFERENCE POINT: NORMAL
RAD ONC ARIA PLAN TOTAL FRACTIONS PRESCRIBED: 16
RAD ONC ARIA PLAN TOTAL PRESCRIBED DOSE: 4256 CGY
RAD ONC ARIA REFERENCE POINT DOSAGE GIVEN TO DATE: 5.32 GY
RAD ONC ARIA REFERENCE POINT ID: NORMAL
RAD ONC ARIA REFERENCE POINT SESSION DOSAGE GIVEN: 2.66 GY

## 2025-04-30 PROCEDURE — 77385: CPT | Performed by: RADIOLOGY

## 2025-04-30 NOTE — PROGRESS NOTES
On Treatment Visit Note      Patient Name: Delia Rodriguez  : 1945   MRN: [id number]     Diagnosis:    Chief Complaint   Patient presents with    Breast Cancer     Malignant Neoplasm of Upper-Outer Quadrant of Left Breast       Staging: No matching staging information was found for the patient.       This patient was seen today for an on treatment visit.  Patient has just received 2 treatments today.  She reports no problems of any kind..    Radiation Treatments       Active   Plans   [facility]   Most recent treatment: Dose planned: 266 cGy (fraction 2 on 2025)   Total: Dose planned: 4,256 cGy (16 fractions)   Elapsed Days: 1      Reference Points   PTV   Most recent treatment: Dose given: 266 cGy (on 2025)   Total: Dose given: 532 cGy   Elapsed Days: 1           Historical   No historical radiation treatments to show.              Subjective      Review of Systems:   Review of Systems    Medications:     Current Outpatient Medications:     clopidogrel (PLAVIX) 75 MG tablet, Take 1 tablet by mouth Daily., Disp: 30 tablet, Rfl: 3    lisinopril (PRINIVIL,ZESTRIL) 10 MG tablet, Take 1 tablet by mouth Daily., Disp: , Rfl:     simvastatin (ZOCOR) 10 MG tablet, Take 1 tablet by mouth Every Other Day., Disp: , Rfl:     aspirin 81 MG EC tablet, Take 1 tablet by mouth Every 4 (Four) Hours As Needed. STATES HASN'T TAKEN FOR A FEW DAYS (Patient not taking: Reported on 2025), Disp: , Rfl:     methocarbamol (ROBAXIN) 500 MG tablet, Take 1 tablet by mouth. (Patient not taking: Reported on 2025), Disp: , Rfl:     Allergies:   Allergies   Allergen Reactions    Penicillins Hives     Beta lactam allergy details  Antibiotic reaction: hives  Age at reaction: adult  Dose to reaction time: (!) hours  Reason for antibiotic: sore throat  Epinephrine required for reaction?: no  Tolerated antibiotics: unknown         Objective     Physical Exam: Patient looks systemically well.  Examination of breast  is deferred to next week.  Physical Exam    Vital Signs:   Vitals:    04/30/25 1204   BP: 169/90   Pulse: 71   Resp: 16   Temp: 97.3 °F (36.3 °C)   TempSrc: Temporal   SpO2: 97%   Weight: 62 kg (136 lb 11.2 oz)   PainSc: 0-No pain     Body mass index is 22.75 kg/m².     Current Total XRT Dose (cGY):    Radiation Treatments       Active   Plans   Lt Brst DIBH   Most recent treatment: Dose planned: 266 cGy (fraction 2 on 4/30/2025)   Total: Dose planned: 4,256 cGy (16 fractions)   Elapsed Days: 1      Reference Points   PTV   Most recent treatment: Dose given: 266 cGy (on 4/30/2025)   Total: Dose given: 532 cGy   Elapsed Days: 1           Historical   No historical radiation treatments to show.              Plan      Plan:   Patient was seen today for an on treatment visit. Patient is receiving radiation therapy to the left breast. Patient is stable and tolerating radiation therapy well with minimal side effects. Continue with radiation therapy.     I have reviewed treatment setup notes, checked and approved the daily guidance images. I reviewed dose delivery, treatment parameters and deemed them appropriate. We plan to continue radiation therapy as prescribed.     Digital speech recognition software was used to dictate parts of this note, some dictation errors may occur.    This document has been electronically signed by Karri Arciniega MD   April 30, 2025 12:25 EDT    Dictated Utilizing Dragon Dictation: Part of this note may be an electronic transcription/translation of spoken language to printed text using the Dragon Dictation System.

## 2025-05-01 ENCOUNTER — TELEPHONE (OUTPATIENT)
Dept: RADIATION ONCOLOGY | Facility: HOSPITAL | Age: 80
End: 2025-05-01
Payer: MEDICARE

## 2025-05-01 ENCOUNTER — HOSPITAL ENCOUNTER (OUTPATIENT)
Dept: RADIATION ONCOLOGY | Facility: HOSPITAL | Age: 80
Setting detail: RADIATION/ONCOLOGY SERIES
End: 2025-05-01
Payer: MEDICARE

## 2025-05-01 NOTE — TELEPHONE ENCOUNTER
Pt stated that she has been sick all night and will not be able to make it in today. Made team aware - cher

## 2025-05-02 ENCOUNTER — HOSPITAL ENCOUNTER (OUTPATIENT)
Dept: RADIATION ONCOLOGY | Facility: HOSPITAL | Age: 80
Discharge: HOME | End: 2025-05-02

## 2025-05-02 LAB
RAD ONC ARIA COURSE ID: NORMAL
RAD ONC ARIA COURSE INTENT: NORMAL
RAD ONC ARIA COURSE LAST TREATMENT DATE: NORMAL
RAD ONC ARIA COURSE START DATE: NORMAL
RAD ONC ARIA COURSE TREATMENT ELAPSED DAYS: 3
RAD ONC ARIA FIRST TREATMENT DATE: NORMAL
RAD ONC ARIA PLAN FRACTIONS TREATED TO DATE: 3
RAD ONC ARIA PLAN ID: NORMAL
RAD ONC ARIA PLAN PRESCRIBED DOSE PER FRACTION: 2.66 GY
RAD ONC ARIA PLAN PRIMARY REFERENCE POINT: NORMAL
RAD ONC ARIA PLAN TOTAL FRACTIONS PRESCRIBED: 16
RAD ONC ARIA PLAN TOTAL PRESCRIBED DOSE: 4256 CGY
RAD ONC ARIA REFERENCE POINT DOSAGE GIVEN TO DATE: 7.98 GY
RAD ONC ARIA REFERENCE POINT ID: NORMAL
RAD ONC ARIA REFERENCE POINT SESSION DOSAGE GIVEN: 2.66 GY

## 2025-05-02 PROCEDURE — 77014 CHG CT GUIDANCE RADIATION THERAPY FLDS PLACEMENT: CPT | Performed by: RADIOLOGY

## 2025-05-02 PROCEDURE — 77385: CPT | Performed by: RADIOLOGY

## 2025-05-05 ENCOUNTER — HOSPITAL ENCOUNTER (OUTPATIENT)
Dept: RADIATION ONCOLOGY | Facility: HOSPITAL | Age: 80
Discharge: HOME | End: 2025-05-05
Payer: MEDICARE

## 2025-05-05 LAB
RAD ONC ARIA COURSE ID: NORMAL
RAD ONC ARIA COURSE INTENT: NORMAL
RAD ONC ARIA COURSE LAST TREATMENT DATE: NORMAL
RAD ONC ARIA COURSE START DATE: NORMAL
RAD ONC ARIA COURSE TREATMENT ELAPSED DAYS: 6
RAD ONC ARIA FIRST TREATMENT DATE: NORMAL
RAD ONC ARIA PLAN FRACTIONS TREATED TO DATE: 4
RAD ONC ARIA PLAN ID: NORMAL
RAD ONC ARIA PLAN PRESCRIBED DOSE PER FRACTION: 2.66 GY
RAD ONC ARIA PLAN PRIMARY REFERENCE POINT: NORMAL
RAD ONC ARIA PLAN TOTAL FRACTIONS PRESCRIBED: 16
RAD ONC ARIA PLAN TOTAL PRESCRIBED DOSE: 4256 CGY
RAD ONC ARIA REFERENCE POINT DOSAGE GIVEN TO DATE: 10.64 GY
RAD ONC ARIA REFERENCE POINT ID: NORMAL
RAD ONC ARIA REFERENCE POINT SESSION DOSAGE GIVEN: 2.66 GY

## 2025-05-05 PROCEDURE — 77385: CPT | Performed by: RADIOLOGY

## 2025-05-05 PROCEDURE — 77014 CHG CT GUIDANCE RADIATION THERAPY FLDS PLACEMENT: CPT | Performed by: RADIOLOGY

## 2025-05-06 ENCOUNTER — HOSPITAL ENCOUNTER (OUTPATIENT)
Dept: RADIATION ONCOLOGY | Facility: HOSPITAL | Age: 80
Discharge: HOME | End: 2025-05-06

## 2025-05-06 LAB
RAD ONC ARIA COURSE ID: NORMAL
RAD ONC ARIA COURSE INTENT: NORMAL
RAD ONC ARIA COURSE LAST TREATMENT DATE: NORMAL
RAD ONC ARIA COURSE START DATE: NORMAL
RAD ONC ARIA COURSE TREATMENT ELAPSED DAYS: 7
RAD ONC ARIA FIRST TREATMENT DATE: NORMAL
RAD ONC ARIA PLAN FRACTIONS TREATED TO DATE: 5
RAD ONC ARIA PLAN ID: NORMAL
RAD ONC ARIA PLAN PRESCRIBED DOSE PER FRACTION: 2.66 GY
RAD ONC ARIA PLAN PRIMARY REFERENCE POINT: NORMAL
RAD ONC ARIA PLAN TOTAL FRACTIONS PRESCRIBED: 16
RAD ONC ARIA PLAN TOTAL PRESCRIBED DOSE: 4256 CGY
RAD ONC ARIA REFERENCE POINT DOSAGE GIVEN TO DATE: 13.3 GY
RAD ONC ARIA REFERENCE POINT ID: NORMAL
RAD ONC ARIA REFERENCE POINT SESSION DOSAGE GIVEN: 2.66 GY

## 2025-05-06 PROCEDURE — 77385: CPT | Performed by: RADIOLOGY

## 2025-05-06 PROCEDURE — 77014 CHG CT GUIDANCE RADIATION THERAPY FLDS PLACEMENT: CPT | Performed by: RADIOLOGY

## 2025-05-07 ENCOUNTER — HOSPITAL ENCOUNTER (OUTPATIENT)
Dept: RADIATION ONCOLOGY | Facility: HOSPITAL | Age: 80
Discharge: HOME | End: 2025-05-07

## 2025-05-07 ENCOUNTER — HOSPITAL ENCOUNTER (OUTPATIENT)
Dept: RADIATION ONCOLOGY | Facility: HOSPITAL | Age: 80
Discharge: HOME | End: 2025-05-07
Payer: MEDICARE

## 2025-05-07 VITALS
DIASTOLIC BLOOD PRESSURE: 96 MMHG | TEMPERATURE: 97.9 F | RESPIRATION RATE: 18 BRPM | BODY MASS INDEX: 22.6 KG/M2 | SYSTOLIC BLOOD PRESSURE: 143 MMHG | OXYGEN SATURATION: 97 % | WEIGHT: 135.8 LBS | HEART RATE: 65 BPM

## 2025-05-07 DIAGNOSIS — Z17.0 MALIGNANT NEOPLASM OF UPPER-OUTER QUADRANT OF LEFT BREAST IN FEMALE, ESTROGEN RECEPTOR POSITIVE: Primary | ICD-10-CM

## 2025-05-07 DIAGNOSIS — C50.412 MALIGNANT NEOPLASM OF UPPER-OUTER QUADRANT OF LEFT BREAST IN FEMALE, ESTROGEN RECEPTOR POSITIVE: Primary | ICD-10-CM

## 2025-05-07 LAB
RAD ONC ARIA COURSE ID: NORMAL
RAD ONC ARIA COURSE INTENT: NORMAL
RAD ONC ARIA COURSE LAST TREATMENT DATE: NORMAL
RAD ONC ARIA COURSE START DATE: NORMAL
RAD ONC ARIA COURSE TREATMENT ELAPSED DAYS: 8
RAD ONC ARIA FIRST TREATMENT DATE: NORMAL
RAD ONC ARIA PLAN FRACTIONS TREATED TO DATE: 6
RAD ONC ARIA PLAN ID: NORMAL
RAD ONC ARIA PLAN PRESCRIBED DOSE PER FRACTION: 2.66 GY
RAD ONC ARIA PLAN PRIMARY REFERENCE POINT: NORMAL
RAD ONC ARIA PLAN TOTAL FRACTIONS PRESCRIBED: 16
RAD ONC ARIA PLAN TOTAL PRESCRIBED DOSE: 4256 CGY
RAD ONC ARIA REFERENCE POINT DOSAGE GIVEN TO DATE: 15.96 GY
RAD ONC ARIA REFERENCE POINT ID: NORMAL
RAD ONC ARIA REFERENCE POINT SESSION DOSAGE GIVEN: 2.66 GY

## 2025-05-07 PROCEDURE — 77014 CHG CT GUIDANCE RADIATION THERAPY FLDS PLACEMENT: CPT | Performed by: RADIOLOGY

## 2025-05-07 PROCEDURE — 77385: CPT | Performed by: RADIOLOGY

## 2025-05-07 PROCEDURE — 77336 RADIATION PHYSICS CONSULT: CPT | Performed by: RADIOLOGY

## 2025-05-07 NOTE — PROGRESS NOTES
On Treatment Visit     Patient: Delia Rodriguez   YOB: 1945   Medical Record Number: [id number]     Date of Visit  May 7, 2025   Primary Diagnosis:  Cancer Staging C50.412   stage pT1c, pN0 (sn), cM0, infiltrating ductal carcinoma of the left breast, ER positive WA positive HER2 negative MammaPrint high risk luminal type      Ms. Rodriguez was seen today for an on treatment visit. She is receiving radiation therapy to the left breast, upper outer quadrant using a DIBH technique.     Treatment Site Modality Dose per fraction (cGy) Fractions Total dose (cGy)   Left breast  Boost 3D conformal  3D 266  200 5 of 16  0 of 5 1330 of 4256  0 of 1000     Concurrent therapy: No, patient refused chemotherapy    She is doing very well today.  She has no treatment related complaints    There were no vitals filed for this visit.  Delia Rodriguez reports a pain score of 0.  Given her pain assessment as noted, treatment options were discussed and the following options were decided upon as a follow-up plan to address the patient's pain: continuation of current treatment plan for pain.    There were no vitals filed for this visit.  Wt Readings from Last 3 Encounters:   04/30/25 62 kg (136 lb 11.2 oz)   04/07/25 62.6 kg (138 lb)   03/27/25 62 kg (136 lb 9.6 oz)     On exam, she is sitting up in no distress, breathing comfortably on room air.  The patient has no skin reaction at this time.  A female nurse chaperone was present for the exam.    Plan: I have reviewed treatment setup notes and checked and approved the daily guidance images. I reviewed dose delivery and treatment parameters and deemed them appropriate. Continue radiation as prescribed.    @[facility] [facility]@

## 2025-05-08 ENCOUNTER — HOSPITAL ENCOUNTER (OUTPATIENT)
Dept: RADIATION ONCOLOGY | Facility: HOSPITAL | Age: 80
Discharge: HOME | End: 2025-05-08

## 2025-05-08 LAB
RAD ONC ARIA COURSE ID: NORMAL
RAD ONC ARIA COURSE INTENT: NORMAL
RAD ONC ARIA COURSE LAST TREATMENT DATE: NORMAL
RAD ONC ARIA COURSE START DATE: NORMAL
RAD ONC ARIA COURSE TREATMENT ELAPSED DAYS: 9
RAD ONC ARIA FIRST TREATMENT DATE: NORMAL
RAD ONC ARIA PLAN FRACTIONS TREATED TO DATE: 7
RAD ONC ARIA PLAN ID: NORMAL
RAD ONC ARIA PLAN PRESCRIBED DOSE PER FRACTION: 2.66 GY
RAD ONC ARIA PLAN PRIMARY REFERENCE POINT: NORMAL
RAD ONC ARIA PLAN TOTAL FRACTIONS PRESCRIBED: 16
RAD ONC ARIA PLAN TOTAL PRESCRIBED DOSE: 4256 CGY
RAD ONC ARIA REFERENCE POINT DOSAGE GIVEN TO DATE: 18.62 GY
RAD ONC ARIA REFERENCE POINT ID: NORMAL
RAD ONC ARIA REFERENCE POINT SESSION DOSAGE GIVEN: 2.66 GY

## 2025-05-08 PROCEDURE — 77014 CHG CT GUIDANCE RADIATION THERAPY FLDS PLACEMENT: CPT | Performed by: RADIOLOGY

## 2025-05-08 PROCEDURE — 77385: CPT | Performed by: RADIOLOGY

## 2025-05-09 ENCOUNTER — HOSPITAL ENCOUNTER (OUTPATIENT)
Dept: RADIATION ONCOLOGY | Facility: HOSPITAL | Age: 80
Discharge: HOME | End: 2025-05-09

## 2025-05-09 LAB
RAD ONC ARIA COURSE ID: NORMAL
RAD ONC ARIA COURSE INTENT: NORMAL
RAD ONC ARIA COURSE LAST TREATMENT DATE: NORMAL
RAD ONC ARIA COURSE START DATE: NORMAL
RAD ONC ARIA COURSE TREATMENT ELAPSED DAYS: 10
RAD ONC ARIA FIRST TREATMENT DATE: NORMAL
RAD ONC ARIA PLAN FRACTIONS TREATED TO DATE: 8
RAD ONC ARIA PLAN ID: NORMAL
RAD ONC ARIA PLAN PRESCRIBED DOSE PER FRACTION: 2.66 GY
RAD ONC ARIA PLAN PRIMARY REFERENCE POINT: NORMAL
RAD ONC ARIA PLAN TOTAL FRACTIONS PRESCRIBED: 16
RAD ONC ARIA PLAN TOTAL PRESCRIBED DOSE: 4256 CGY
RAD ONC ARIA REFERENCE POINT DOSAGE GIVEN TO DATE: 21.28 GY
RAD ONC ARIA REFERENCE POINT ID: NORMAL
RAD ONC ARIA REFERENCE POINT SESSION DOSAGE GIVEN: 2.66 GY

## 2025-05-09 PROCEDURE — 77385: CPT | Performed by: RADIOLOGY

## 2025-05-09 PROCEDURE — 77014 CHG CT GUIDANCE RADIATION THERAPY FLDS PLACEMENT: CPT | Performed by: RADIOLOGY

## 2025-05-12 ENCOUNTER — HOSPITAL ENCOUNTER (OUTPATIENT)
Dept: RADIATION ONCOLOGY | Facility: HOSPITAL | Age: 80
Discharge: HOME | End: 2025-05-12
Payer: MEDICARE

## 2025-05-12 LAB
RAD ONC ARIA COURSE ID: NORMAL
RAD ONC ARIA COURSE INTENT: NORMAL
RAD ONC ARIA COURSE LAST TREATMENT DATE: NORMAL
RAD ONC ARIA COURSE START DATE: NORMAL
RAD ONC ARIA COURSE TREATMENT ELAPSED DAYS: 13
RAD ONC ARIA FIRST TREATMENT DATE: NORMAL
RAD ONC ARIA PLAN FRACTIONS TREATED TO DATE: 9
RAD ONC ARIA PLAN ID: NORMAL
RAD ONC ARIA PLAN PRESCRIBED DOSE PER FRACTION: 2.66 GY
RAD ONC ARIA PLAN PRIMARY REFERENCE POINT: NORMAL
RAD ONC ARIA PLAN TOTAL FRACTIONS PRESCRIBED: 16
RAD ONC ARIA PLAN TOTAL PRESCRIBED DOSE: 4256 CGY
RAD ONC ARIA REFERENCE POINT DOSAGE GIVEN TO DATE: 23.94 GY
RAD ONC ARIA REFERENCE POINT ID: NORMAL
RAD ONC ARIA REFERENCE POINT SESSION DOSAGE GIVEN: 2.66 GY

## 2025-05-12 PROCEDURE — 77014 CHG CT GUIDANCE RADIATION THERAPY FLDS PLACEMENT: CPT | Performed by: RADIOLOGY

## 2025-05-12 PROCEDURE — 77385: CPT | Performed by: RADIOLOGY

## 2025-05-13 ENCOUNTER — HOSPITAL ENCOUNTER (OUTPATIENT)
Dept: RADIATION ONCOLOGY | Facility: HOSPITAL | Age: 80
Discharge: HOME | End: 2025-05-13

## 2025-05-13 LAB
RAD ONC ARIA COURSE ID: NORMAL
RAD ONC ARIA COURSE INTENT: NORMAL
RAD ONC ARIA COURSE LAST TREATMENT DATE: NORMAL
RAD ONC ARIA COURSE START DATE: NORMAL
RAD ONC ARIA COURSE TREATMENT ELAPSED DAYS: 14
RAD ONC ARIA FIRST TREATMENT DATE: NORMAL
RAD ONC ARIA PLAN FRACTIONS TREATED TO DATE: 10
RAD ONC ARIA PLAN ID: NORMAL
RAD ONC ARIA PLAN PRESCRIBED DOSE PER FRACTION: 2.66 GY
RAD ONC ARIA PLAN PRIMARY REFERENCE POINT: NORMAL
RAD ONC ARIA PLAN TOTAL FRACTIONS PRESCRIBED: 16
RAD ONC ARIA PLAN TOTAL PRESCRIBED DOSE: 4256 CGY
RAD ONC ARIA REFERENCE POINT DOSAGE GIVEN TO DATE: 26.6 GY
RAD ONC ARIA REFERENCE POINT ID: NORMAL
RAD ONC ARIA REFERENCE POINT SESSION DOSAGE GIVEN: 2.66 GY

## 2025-05-13 PROCEDURE — 77014 CHG CT GUIDANCE RADIATION THERAPY FLDS PLACEMENT: CPT | Performed by: RADIOLOGY

## 2025-05-13 PROCEDURE — 77385: CPT | Performed by: RADIOLOGY

## 2025-05-14 ENCOUNTER — HOSPITAL ENCOUNTER (OUTPATIENT)
Dept: RADIATION ONCOLOGY | Facility: HOSPITAL | Age: 80
Discharge: HOME | End: 2025-05-14

## 2025-05-14 VITALS
DIASTOLIC BLOOD PRESSURE: 76 MMHG | OXYGEN SATURATION: 97 % | BODY MASS INDEX: 22.63 KG/M2 | RESPIRATION RATE: 18 BRPM | SYSTOLIC BLOOD PRESSURE: 139 MMHG | WEIGHT: 136 LBS | TEMPERATURE: 97.8 F | HEART RATE: 72 BPM

## 2025-05-14 DIAGNOSIS — C50.412 MALIGNANT NEOPLASM OF UPPER-OUTER QUADRANT OF LEFT BREAST IN FEMALE, ESTROGEN RECEPTOR POSITIVE: Primary | ICD-10-CM

## 2025-05-14 DIAGNOSIS — Z17.0 MALIGNANT NEOPLASM OF UPPER-OUTER QUADRANT OF LEFT BREAST IN FEMALE, ESTROGEN RECEPTOR POSITIVE: Primary | ICD-10-CM

## 2025-05-14 LAB
RAD ONC ARIA COURSE ID: NORMAL
RAD ONC ARIA COURSE INTENT: NORMAL
RAD ONC ARIA COURSE LAST TREATMENT DATE: NORMAL
RAD ONC ARIA COURSE START DATE: NORMAL
RAD ONC ARIA COURSE TREATMENT ELAPSED DAYS: 15
RAD ONC ARIA FIRST TREATMENT DATE: NORMAL
RAD ONC ARIA PLAN FRACTIONS TREATED TO DATE: 11
RAD ONC ARIA PLAN ID: NORMAL
RAD ONC ARIA PLAN PRESCRIBED DOSE PER FRACTION: 2.66 GY
RAD ONC ARIA PLAN PRIMARY REFERENCE POINT: NORMAL
RAD ONC ARIA PLAN TOTAL FRACTIONS PRESCRIBED: 16
RAD ONC ARIA PLAN TOTAL PRESCRIBED DOSE: 4256 CGY
RAD ONC ARIA REFERENCE POINT DOSAGE GIVEN TO DATE: 29.26 GY
RAD ONC ARIA REFERENCE POINT ID: NORMAL
RAD ONC ARIA REFERENCE POINT SESSION DOSAGE GIVEN: 2.66 GY

## 2025-05-14 PROCEDURE — 77427 RADIATION TX MANAGEMENT X5: CPT | Performed by: RADIOLOGY

## 2025-05-14 PROCEDURE — 77014 CHG CT GUIDANCE RADIATION THERAPY FLDS PLACEMENT: CPT | Performed by: RADIOLOGY

## 2025-05-14 PROCEDURE — 77336 RADIATION PHYSICS CONSULT: CPT | Performed by: RADIOLOGY

## 2025-05-14 PROCEDURE — 77385: CPT | Performed by: RADIOLOGY

## 2025-05-14 NOTE — PROGRESS NOTES
On Treatment Visit Note      Patient Name: Delia Rodriguez  : 1945   MRN: [id number]     Diagnosis:    Stage IA (cT1c, cN0, cM0, G2, ER+, IN+, HER2-) ductal carcinoma of the left breast status post breast conservation surgery with negative sentinel lymph node biopsies.  MammaPrint testing revealed high risk disease.  The patient refused chemotherapy.    This patient was seen today for an on treatment visit.  To date, the patient has received 2926 cGy of a 4256 cGy regimen to the left breast.  A tumor bed boost will follow.    Radiation Treatments       Active   Plans   [facility]   Most recent treatment: Dose planned: 266 cGy (fraction 11 on 2025)   Total: Dose planned: 4,256 cGy (16 fractions)   Elapsed Days: 15      Reference Points   PTV   Most recent treatment: Dose given: 266 cGy (on 2025)   Total: Dose given: 2,926 cGy   Elapsed Days: 15           Historical   No historical radiation treatments to show.              Subjective      Treatment Tolerance:   The patient appears to be tolerating hypofractionated radiotherapy well.  She has no treatment related side effects to report.    Medications:     Current Outpatient Medications:     clopidogrel (PLAVIX) 75 MG tablet, Take 1 tablet by mouth Daily., Disp: 30 tablet, Rfl: 3    lisinopril (PRINIVIL,ZESTRIL) 10 MG tablet, Take 1 tablet by mouth Daily., Disp: , Rfl:     simvastatin (ZOCOR) 10 MG tablet, Take 1 tablet by mouth Every Other Day., Disp: , Rfl:     aspirin 81 MG EC tablet, Take 1 tablet by mouth Every 4 (Four) Hours As Needed. STATES HASN'T TAKEN FOR A FEW DAYS (Patient not taking: Reported on 2025), Disp: , Rfl:     methocarbamol (ROBAXIN) 500 MG tablet, Take 1 tablet by mouth. (Patient not taking: Reported on 2025), Disp: , Rfl:     Allergies:   Allergies   Allergen Reactions    Penicillins Hives     Beta lactam allergy details  Antibiotic reaction: hives  Age at reaction: adult  Dose to reaction time: (!)  hours  Reason for antibiotic: sore throat  Epinephrine required for reaction?: no  Tolerated antibiotics: unknown         Objective     Physical Exam:  The patient is a well-nourished, fit appearing elderly white female in no acute distress.  Vital signs as below.  .    Vital Signs:   Vitals:    05/14/25 1152   BP: 139/76   Pulse: 72   Resp: 18   Temp: 97.8 °F (36.6 °C)   TempSrc: Temporal   SpO2: 97%   Weight: 61.7 kg (136 lb)   PainSc: 0-No pain     Body mass index is 22.63 kg/m².     Neck: Short, supple, no detectable cervical or periclavicular lymphadenopathy  Heart: Regular rate and rhythm  Lungs: Clear to auscultation bilaterally  Breasts: Well-healed surgical scars noted in the left breast.  Integumentary: No radiation erythema noted over the treated breast  Lymphatics: No detectable left axillary adenopathy  Extremities: No left arm swelling noted    Current Total XRT Dose (cGY):    Radiation Treatments       Active   Plans   [facility]   Most recent treatment: Dose planned: 266 cGy (fraction 11 on 5/14/2025)   Total: Dose planned: 4,256 cGy (16 fractions)   Elapsed Days: 15      Reference Points   PTV   Most recent treatment: Dose given: 266 cGy (on 5/14/2025)   Total: Dose given: 2,926 cGy   Elapsed Days: 15           Historical   No historical radiation treatments to show.              Plan      Plan:   Patient was seen today for an on treatment visit. Patient is receiving radiation therapy to the left breast. Patient is stable and tolerating radiation therapy well with minimal side effects. Continue with radiation therapy.     I have reviewed treatment setup notes, checked and approved the daily guidance images. I reviewed dose delivery, treatment parameters and deemed them appropriate. We plan to continue radiation therapy as prescribed.     Digital speech recognition software was used to dictate parts of this note, some dictation errors may occur.    Duane Thomas MD   05/14/25 12:30 EDT

## 2025-05-15 ENCOUNTER — HOSPITAL ENCOUNTER (OUTPATIENT)
Dept: RADIATION ONCOLOGY | Facility: HOSPITAL | Age: 80
Discharge: HOME | End: 2025-05-15

## 2025-05-15 LAB
RAD ONC ARIA COURSE ID: NORMAL
RAD ONC ARIA COURSE INTENT: NORMAL
RAD ONC ARIA COURSE LAST TREATMENT DATE: NORMAL
RAD ONC ARIA COURSE START DATE: NORMAL
RAD ONC ARIA COURSE TREATMENT ELAPSED DAYS: 16
RAD ONC ARIA FIRST TREATMENT DATE: NORMAL
RAD ONC ARIA PLAN FRACTIONS TREATED TO DATE: 12
RAD ONC ARIA PLAN ID: NORMAL
RAD ONC ARIA PLAN PRESCRIBED DOSE PER FRACTION: 2.66 GY
RAD ONC ARIA PLAN PRIMARY REFERENCE POINT: NORMAL
RAD ONC ARIA PLAN TOTAL FRACTIONS PRESCRIBED: 16
RAD ONC ARIA PLAN TOTAL PRESCRIBED DOSE: 4256 CGY
RAD ONC ARIA REFERENCE POINT DOSAGE GIVEN TO DATE: 31.92 GY
RAD ONC ARIA REFERENCE POINT ID: NORMAL
RAD ONC ARIA REFERENCE POINT SESSION DOSAGE GIVEN: 2.66 GY

## 2025-05-15 PROCEDURE — 77014 CHG CT GUIDANCE RADIATION THERAPY FLDS PLACEMENT: CPT | Performed by: RADIOLOGY

## 2025-05-15 PROCEDURE — 77385: CPT | Performed by: RADIOLOGY

## 2025-05-16 ENCOUNTER — HOSPITAL ENCOUNTER (OUTPATIENT)
Dept: RADIATION ONCOLOGY | Facility: HOSPITAL | Age: 80
Discharge: HOME | End: 2025-05-16

## 2025-05-16 LAB
RAD ONC ARIA COURSE ID: NORMAL
RAD ONC ARIA COURSE INTENT: NORMAL
RAD ONC ARIA COURSE LAST TREATMENT DATE: NORMAL
RAD ONC ARIA COURSE START DATE: NORMAL
RAD ONC ARIA COURSE TREATMENT ELAPSED DAYS: 17
RAD ONC ARIA FIRST TREATMENT DATE: NORMAL
RAD ONC ARIA PLAN FRACTIONS TREATED TO DATE: 13
RAD ONC ARIA PLAN ID: NORMAL
RAD ONC ARIA PLAN PRESCRIBED DOSE PER FRACTION: 2.66 GY
RAD ONC ARIA PLAN PRIMARY REFERENCE POINT: NORMAL
RAD ONC ARIA PLAN TOTAL FRACTIONS PRESCRIBED: 16
RAD ONC ARIA PLAN TOTAL PRESCRIBED DOSE: 4256 CGY
RAD ONC ARIA REFERENCE POINT DOSAGE GIVEN TO DATE: 34.58 GY
RAD ONC ARIA REFERENCE POINT ID: NORMAL
RAD ONC ARIA REFERENCE POINT SESSION DOSAGE GIVEN: 2.66 GY

## 2025-05-16 PROCEDURE — 77014 CHG CT GUIDANCE RADIATION THERAPY FLDS PLACEMENT: CPT | Performed by: RADIOLOGY

## 2025-05-16 PROCEDURE — 77385: CPT | Performed by: RADIOLOGY

## 2025-05-19 ENCOUNTER — HOSPITAL ENCOUNTER (OUTPATIENT)
Dept: RADIATION ONCOLOGY | Facility: HOSPITAL | Age: 80
Discharge: HOME | End: 2025-05-19
Payer: MEDICARE

## 2025-05-19 LAB
RAD ONC ARIA COURSE ID: NORMAL
RAD ONC ARIA COURSE INTENT: NORMAL
RAD ONC ARIA COURSE LAST TREATMENT DATE: NORMAL
RAD ONC ARIA COURSE START DATE: NORMAL
RAD ONC ARIA COURSE TREATMENT ELAPSED DAYS: 20
RAD ONC ARIA FIRST TREATMENT DATE: NORMAL
RAD ONC ARIA PLAN FRACTIONS TREATED TO DATE: 14
RAD ONC ARIA PLAN ID: NORMAL
RAD ONC ARIA PLAN PRESCRIBED DOSE PER FRACTION: 2.66 GY
RAD ONC ARIA PLAN PRIMARY REFERENCE POINT: NORMAL
RAD ONC ARIA PLAN TOTAL FRACTIONS PRESCRIBED: 16
RAD ONC ARIA PLAN TOTAL PRESCRIBED DOSE: 4256 CGY
RAD ONC ARIA REFERENCE POINT DOSAGE GIVEN TO DATE: 37.24 GY
RAD ONC ARIA REFERENCE POINT ID: NORMAL
RAD ONC ARIA REFERENCE POINT SESSION DOSAGE GIVEN: 2.66 GY

## 2025-05-19 PROCEDURE — 77385: CPT | Performed by: RADIOLOGY

## 2025-05-19 PROCEDURE — 77014 CHG CT GUIDANCE RADIATION THERAPY FLDS PLACEMENT: CPT | Performed by: RADIOLOGY

## 2025-05-20 ENCOUNTER — HOSPITAL ENCOUNTER (OUTPATIENT)
Dept: RADIATION ONCOLOGY | Facility: HOSPITAL | Age: 80
Discharge: HOME | End: 2025-05-20

## 2025-05-20 LAB
RAD ONC ARIA COURSE ID: NORMAL
RAD ONC ARIA COURSE INTENT: NORMAL
RAD ONC ARIA COURSE LAST TREATMENT DATE: NORMAL
RAD ONC ARIA COURSE START DATE: NORMAL
RAD ONC ARIA COURSE TREATMENT ELAPSED DAYS: 21
RAD ONC ARIA FIRST TREATMENT DATE: NORMAL
RAD ONC ARIA PLAN FRACTIONS TREATED TO DATE: 15
RAD ONC ARIA PLAN ID: NORMAL
RAD ONC ARIA PLAN PRESCRIBED DOSE PER FRACTION: 2.66 GY
RAD ONC ARIA PLAN PRIMARY REFERENCE POINT: NORMAL
RAD ONC ARIA PLAN TOTAL FRACTIONS PRESCRIBED: 16
RAD ONC ARIA PLAN TOTAL PRESCRIBED DOSE: 4256 CGY
RAD ONC ARIA REFERENCE POINT DOSAGE GIVEN TO DATE: 39.9 GY
RAD ONC ARIA REFERENCE POINT ID: NORMAL
RAD ONC ARIA REFERENCE POINT SESSION DOSAGE GIVEN: 2.66 GY

## 2025-05-20 PROCEDURE — 77014 CHG CT GUIDANCE RADIATION THERAPY FLDS PLACEMENT: CPT | Performed by: RADIOLOGY

## 2025-05-20 PROCEDURE — 77385: CPT | Performed by: RADIOLOGY

## 2025-05-21 ENCOUNTER — HOSPITAL ENCOUNTER (OUTPATIENT)
Dept: RADIATION ONCOLOGY | Facility: HOSPITAL | Age: 80
Discharge: HOME | End: 2025-05-21

## 2025-05-21 VITALS
WEIGHT: 136 LBS | OXYGEN SATURATION: 97 % | RESPIRATION RATE: 16 BRPM | SYSTOLIC BLOOD PRESSURE: 133 MMHG | DIASTOLIC BLOOD PRESSURE: 64 MMHG | HEART RATE: 67 BPM | BODY MASS INDEX: 22.63 KG/M2 | TEMPERATURE: 97.8 F

## 2025-05-21 DIAGNOSIS — Z17.0 MALIGNANT NEOPLASM OF UPPER-OUTER QUADRANT OF LEFT BREAST IN FEMALE, ESTROGEN RECEPTOR POSITIVE: Primary | ICD-10-CM

## 2025-05-21 DIAGNOSIS — C50.412 MALIGNANT NEOPLASM OF UPPER-OUTER QUADRANT OF LEFT BREAST IN FEMALE, ESTROGEN RECEPTOR POSITIVE: Primary | ICD-10-CM

## 2025-05-21 LAB
RAD ONC ARIA COURSE ID: NORMAL
RAD ONC ARIA COURSE INTENT: NORMAL
RAD ONC ARIA COURSE LAST TREATMENT DATE: NORMAL
RAD ONC ARIA COURSE START DATE: NORMAL
RAD ONC ARIA COURSE TREATMENT ELAPSED DAYS: 22
RAD ONC ARIA FIRST TREATMENT DATE: NORMAL
RAD ONC ARIA PLAN FRACTIONS TREATED TO DATE: 16
RAD ONC ARIA PLAN ID: NORMAL
RAD ONC ARIA PLAN PRESCRIBED DOSE PER FRACTION: 2.66 GY
RAD ONC ARIA PLAN PRIMARY REFERENCE POINT: NORMAL
RAD ONC ARIA PLAN TOTAL FRACTIONS PRESCRIBED: 16
RAD ONC ARIA PLAN TOTAL PRESCRIBED DOSE: 4256 CGY
RAD ONC ARIA REFERENCE POINT DOSAGE GIVEN TO DATE: 42.56 GY
RAD ONC ARIA REFERENCE POINT ID: NORMAL
RAD ONC ARIA REFERENCE POINT SESSION DOSAGE GIVEN: 2.66 GY

## 2025-05-21 PROCEDURE — 77427 RADIATION TX MANAGEMENT X5: CPT | Performed by: RADIOLOGY

## 2025-05-21 PROCEDURE — 77014 CHG CT GUIDANCE RADIATION THERAPY FLDS PLACEMENT: CPT | Performed by: RADIOLOGY

## 2025-05-21 PROCEDURE — 77336 RADIATION PHYSICS CONSULT: CPT | Performed by: RADIOLOGY

## 2025-05-21 PROCEDURE — 77385: CPT | Performed by: RADIOLOGY

## 2025-05-21 NOTE — PROGRESS NOTES
On Treatment Visit Note      Patient Name: Delia Rodriguez  : 1945   MRN: [id number]     Diagnosis:    Stage I (pT1c pN0 sn M0) carcinoma of the left breast status post breast conservation surgery.  The malignancy is positive for estrogen and progesterone receptors.    This patient was seen today for an on treatment visit.  MammaPrint had shown the patient to have a high risk malignancy.  Mrs. Rodriguez refused chemotherapy.  The patient is receiving postoperative left breast radiotherapy.  She completes a hypofractionated 4256 cGy regimen to the breast today.  The patient will receive 1000 cGy to the left breast tumor bed    Radiation Treatments       Active   Reference Points   PTV   Most recent treatment: Dose given: 266 cGy (on 2025)   Total: Dose given: 4,256 cGy   Elapsed Days: 22           Historical   Plans   Lt Brst [facility]   Most recent treatment: Dose planned: 266 cGy (fraction 16 on 2025)   Total: Dose planned: 4,256 cGy (16 fractions)   Elapsed Days: 22                    Subjective      Treatment Tolerance:   Patient is tolerating radiotherapy well.  She has no acute side effects to report from treatment.    Medications:     Current Outpatient Medications:     clopidogrel (PLAVIX) 75 MG tablet, Take 1 tablet by mouth Daily., Disp: 30 tablet, Rfl: 3    lisinopril (PRINIVIL,ZESTRIL) 10 MG tablet, Take 1 tablet by mouth Daily., Disp: , Rfl:     simvastatin (ZOCOR) 10 MG tablet, Take 1 tablet by mouth Every Other Day., Disp: , Rfl:     aspirin 81 MG EC tablet, Take 1 tablet by mouth Every 4 (Four) Hours As Needed. STATES HASN'T TAKEN FOR A FEW DAYS (Patient not taking: Reported on 2025), Disp: , Rfl:     methocarbamol (ROBAXIN) 500 MG tablet, Take 1 tablet by mouth. (Patient not taking: Reported on 2025), Disp: , Rfl:     Allergies:   Allergies   Allergen Reactions    Penicillins Hives     Beta lactam allergy details  Antibiotic reaction: hives  Age at reaction: adult  Dose  to reaction time: (!) hours  Reason for antibiotic: sore throat  Epinephrine required for reaction?: no  Tolerated antibiotics: unknown         Objective     Physical Exam:  The patient is a well-nourished, well-developed fit appearing elderly white female in no acute distress.  Vital signs as below.  .    Vital Signs:   Vitals:    05/21/25 1152   BP: 133/64   Pulse: 67   Resp: 16   Temp: 97.8 °F (36.6 °C)   TempSrc: Temporal   SpO2: 97%   Weight: 61.7 kg (136 lb)   PainSc: 0-No pain     Body mass index is 22.63 kg/m².     Neck: Short, supple, no detectable cervical or periclavicular lymphadenopathy  Heart: Regular rate and rhythm  Lungs: Clear to auscultation bilaterally  Breasts: Surgical scars in the left breast are well-healed.  There is no significant erythema noted over the treated region  Lymphatics: No left axillary lymphadenopathy  Extremities: No left arm swelling noted    Current Total XRT Dose (cGY):    Radiation Treatments       Active   Reference Points   PTV   Most recent treatment: Dose given: 266 cGy (on 5/21/2025)   Total: Dose given: 4,256 cGy   Elapsed Days: 22           Historical   Plans   Lt Brst [facility]   Most recent treatment: Dose planned: 266 cGy (fraction 16 on 5/21/2025)   Total: Dose planned: 4,256 cGy (16 fractions)   Elapsed Days: 22                    Plan      Plan:   Patient was seen today for an on treatment visit. Patient is receiving radiation therapy to the left breast. Patient is stable and tolerating radiation therapy well with minimal side effects. Continue with radiation therapy.  Boost radiotherapy to the tumor bed will commence on 5/22/2025.    I have reviewed treatment setup notes, checked and approved the daily guidance images. I reviewed dose delivery, treatment parameters and deemed them appropriate. We plan to continue radiation therapy as prescribed.     Digital speech recognition software was used to dictate parts of this note, some dictation errors may  occur.    Duane Thomas MD   05/21/25 11:57 EDT

## 2025-05-22 ENCOUNTER — HOSPITAL ENCOUNTER (OUTPATIENT)
Dept: RADIATION ONCOLOGY | Facility: HOSPITAL | Age: 80
Discharge: HOME | End: 2025-05-22

## 2025-05-22 LAB
RAD ONC ARIA COURSE ID: NORMAL
RAD ONC ARIA COURSE INTENT: NORMAL
RAD ONC ARIA COURSE LAST TREATMENT DATE: NORMAL
RAD ONC ARIA COURSE START DATE: NORMAL
RAD ONC ARIA COURSE TREATMENT ELAPSED DAYS: 23
RAD ONC ARIA FIRST TREATMENT DATE: NORMAL
RAD ONC ARIA PLAN FRACTIONS TREATED TO DATE: 1
RAD ONC ARIA PLAN ID: NORMAL
RAD ONC ARIA PLAN PRESCRIBED DOSE PER FRACTION: 2 GY
RAD ONC ARIA PLAN PRIMARY REFERENCE POINT: NORMAL
RAD ONC ARIA PLAN TOTAL FRACTIONS PRESCRIBED: 5
RAD ONC ARIA PLAN TOTAL PRESCRIBED DOSE: 1000 CGY
RAD ONC ARIA REFERENCE POINT DOSAGE GIVEN TO DATE: 2 GY
RAD ONC ARIA REFERENCE POINT ID: NORMAL
RAD ONC ARIA REFERENCE POINT SESSION DOSAGE GIVEN: 2 GY

## 2025-05-22 PROCEDURE — 77412 RADIATION TX DELIVERY LVL 3: CPT | Performed by: RADIOLOGY

## 2025-05-22 PROCEDURE — 77280 THER RAD SIMULAJ FIELD SMPL: CPT | Performed by: RADIOLOGY

## 2025-05-22 PROCEDURE — G6002 STEREOSCOPIC X-RAY GUIDANCE: HCPCS | Performed by: RADIOLOGY

## 2025-05-23 ENCOUNTER — HOSPITAL ENCOUNTER (OUTPATIENT)
Dept: RADIATION ONCOLOGY | Facility: HOSPITAL | Age: 80
Discharge: HOME | End: 2025-05-23

## 2025-05-23 LAB
RAD ONC ARIA COURSE ID: NORMAL
RAD ONC ARIA COURSE INTENT: NORMAL
RAD ONC ARIA COURSE LAST TREATMENT DATE: NORMAL
RAD ONC ARIA COURSE START DATE: NORMAL
RAD ONC ARIA COURSE TREATMENT ELAPSED DAYS: 24
RAD ONC ARIA FIRST TREATMENT DATE: NORMAL
RAD ONC ARIA PLAN FRACTIONS TREATED TO DATE: 2
RAD ONC ARIA PLAN ID: NORMAL
RAD ONC ARIA PLAN PRESCRIBED DOSE PER FRACTION: 2 GY
RAD ONC ARIA PLAN PRIMARY REFERENCE POINT: NORMAL
RAD ONC ARIA PLAN TOTAL FRACTIONS PRESCRIBED: 5
RAD ONC ARIA PLAN TOTAL PRESCRIBED DOSE: 1000 CGY
RAD ONC ARIA REFERENCE POINT DOSAGE GIVEN TO DATE: 4 GY
RAD ONC ARIA REFERENCE POINT ID: NORMAL
RAD ONC ARIA REFERENCE POINT SESSION DOSAGE GIVEN: 2 GY

## 2025-05-23 PROCEDURE — G6002 STEREOSCOPIC X-RAY GUIDANCE: HCPCS | Performed by: RADIOLOGY

## 2025-05-23 PROCEDURE — 77387 GUIDANCE FOR RADJ TX DLVR: CPT | Performed by: RADIOLOGY

## 2025-05-23 PROCEDURE — 77412 RADIATION TX DELIVERY LVL 3: CPT | Performed by: RADIOLOGY

## 2025-05-27 ENCOUNTER — HOSPITAL ENCOUNTER (OUTPATIENT)
Dept: RADIATION ONCOLOGY | Facility: HOSPITAL | Age: 80
Discharge: HOME | End: 2025-05-27

## 2025-05-27 LAB
RAD ONC ARIA COURSE ID: NORMAL
RAD ONC ARIA COURSE INTENT: NORMAL
RAD ONC ARIA COURSE LAST TREATMENT DATE: NORMAL
RAD ONC ARIA COURSE START DATE: NORMAL
RAD ONC ARIA COURSE TREATMENT ELAPSED DAYS: 28
RAD ONC ARIA FIRST TREATMENT DATE: NORMAL
RAD ONC ARIA PLAN FRACTIONS TREATED TO DATE: 3
RAD ONC ARIA PLAN ID: NORMAL
RAD ONC ARIA PLAN PRESCRIBED DOSE PER FRACTION: 2 GY
RAD ONC ARIA PLAN PRIMARY REFERENCE POINT: NORMAL
RAD ONC ARIA PLAN TOTAL FRACTIONS PRESCRIBED: 5
RAD ONC ARIA PLAN TOTAL PRESCRIBED DOSE: 1000 CGY
RAD ONC ARIA REFERENCE POINT DOSAGE GIVEN TO DATE: 6 GY
RAD ONC ARIA REFERENCE POINT ID: NORMAL
RAD ONC ARIA REFERENCE POINT SESSION DOSAGE GIVEN: 2 GY

## 2025-05-27 PROCEDURE — 77412 RADIATION TX DELIVERY LVL 3: CPT | Performed by: RADIOLOGY

## 2025-05-27 PROCEDURE — 77387 GUIDANCE FOR RADJ TX DLVR: CPT | Performed by: RADIOLOGY

## 2025-05-27 PROCEDURE — G6002 STEREOSCOPIC X-RAY GUIDANCE: HCPCS | Performed by: RADIOLOGY

## 2025-05-28 ENCOUNTER — HOSPITAL ENCOUNTER (OUTPATIENT)
Dept: RADIATION ONCOLOGY | Facility: HOSPITAL | Age: 80
Discharge: HOME | End: 2025-05-28

## 2025-05-28 VITALS
RESPIRATION RATE: 16 BRPM | HEART RATE: 76 BPM | OXYGEN SATURATION: 99 % | TEMPERATURE: 97.1 F | SYSTOLIC BLOOD PRESSURE: 144 MMHG | WEIGHT: 136 LBS | BODY MASS INDEX: 22.63 KG/M2 | DIASTOLIC BLOOD PRESSURE: 75 MMHG

## 2025-05-28 DIAGNOSIS — Z17.0 MALIGNANT NEOPLASM OF UPPER-OUTER QUADRANT OF LEFT BREAST IN FEMALE, ESTROGEN RECEPTOR POSITIVE: Primary | ICD-10-CM

## 2025-05-28 DIAGNOSIS — C50.412 MALIGNANT NEOPLASM OF UPPER-OUTER QUADRANT OF LEFT BREAST IN FEMALE, ESTROGEN RECEPTOR POSITIVE: Primary | ICD-10-CM

## 2025-05-28 LAB
RAD ONC ARIA COURSE ID: NORMAL
RAD ONC ARIA COURSE INTENT: NORMAL
RAD ONC ARIA COURSE LAST TREATMENT DATE: NORMAL
RAD ONC ARIA COURSE START DATE: NORMAL
RAD ONC ARIA COURSE TREATMENT ELAPSED DAYS: 29
RAD ONC ARIA FIRST TREATMENT DATE: NORMAL
RAD ONC ARIA PLAN FRACTIONS TREATED TO DATE: 4
RAD ONC ARIA PLAN ID: NORMAL
RAD ONC ARIA PLAN PRESCRIBED DOSE PER FRACTION: 2 GY
RAD ONC ARIA PLAN PRIMARY REFERENCE POINT: NORMAL
RAD ONC ARIA PLAN TOTAL FRACTIONS PRESCRIBED: 5
RAD ONC ARIA PLAN TOTAL PRESCRIBED DOSE: 1000 CGY
RAD ONC ARIA REFERENCE POINT DOSAGE GIVEN TO DATE: 8 GY
RAD ONC ARIA REFERENCE POINT ID: NORMAL
RAD ONC ARIA REFERENCE POINT SESSION DOSAGE GIVEN: 2 GY

## 2025-05-28 PROCEDURE — G6002 STEREOSCOPIC X-RAY GUIDANCE: HCPCS | Performed by: RADIOLOGY

## 2025-05-28 PROCEDURE — 77412 RADIATION TX DELIVERY LVL 3: CPT | Performed by: RADIOLOGY

## 2025-05-28 PROCEDURE — 77387 GUIDANCE FOR RADJ TX DLVR: CPT | Performed by: RADIOLOGY

## 2025-05-28 NOTE — PROGRESS NOTES
On Treatment Visit Note      Patient Name: Delia Rodriguez  : 1945   MRN: [id number]     Diagnosis:    Stage IA (pT1c pN0 sn cM0) ductal carcinoma of the left breast status post breast conservation surgery.  The malignancy is positive for estrogen and progesterone receptors.  The patient refused chemotherapy despite having a high risk malignancy on MammaPrint studies.    This patient was seen today for an on treatment visit.  Patient has received 4256 cGy to the left breast.  She is currently at 800 cGy of a 1000 cGy tumor bed boost.    Radiation Treatments       Active   Plans   L Boost DIB[facility]   Most recent treatment: Dose planned: 200 cGy (fraction 4 on 2025)   Total: Dose planned: 1,000 cGy (5 fractions)   Elapsed Days: 29      Reference Points   PTV   Most recent treatment: Dose given: 266 cGy (on 2025)   Total: Dose given: 4,256 cGy   Elapsed Days: 22      PTV Boost   Most recent treatment: Dose given: 200 cGy (on 2025)   Total: Dose given: 800 cGy   Elapsed Days: 29           Historical   Plans   Lt Brst [facility]   Most recent treatment: Dose planned: 266 cGy (fraction 16 on 2025)   Total: Dose planned: 4,256 cGy (16 fractions)   Elapsed Days: 22                    Subjective      Treatment Tolerance:   Mrs. Rodriguez is tolerating treatment well.  She denies significant acute side effects attributable to radiotherapy.  Patient notes some mild pruritus over the sternal region.  She denies breast pain, chest wall discomfort, and arm swelling.    Medications:     Current Outpatient Medications:     clopidogrel (PLAVIX) 75 MG tablet, Take 1 tablet by mouth Daily., Disp: 30 tablet, Rfl: 3    lisinopril (PRINIVIL,ZESTRIL) 10 MG tablet, Take 1 tablet by mouth Daily., Disp: , Rfl:     simvastatin (ZOCOR) 10 MG tablet, Take 1 tablet by mouth Every Other Day., Disp: , Rfl:     aspirin 81 MG EC tablet, Take 1 tablet by mouth Every 4 (Four) Hours As Needed. STATES HASN'T TAKEN FOR A FEW  DAYS (Patient not taking: Reported on 4/7/2025), Disp: , Rfl:     methocarbamol (ROBAXIN) 500 MG tablet, Take 1 tablet by mouth. (Patient not taking: Reported on 5/28/2025), Disp: , Rfl:     Allergies:   Allergies   Allergen Reactions    Penicillins Hives     Beta lactam allergy details  Antibiotic reaction: hives  Age at reaction: adult  Dose to reaction time: (!) hours  Reason for antibiotic: sore throat  Epinephrine required for reaction?: no  Tolerated antibiotics: unknown         Objective     Physical Exam:  The patient is an elderly somewhat frail-appearing white female in no acute distress.  Vital signs as below.  .    Vital Signs:   Vitals:    05/28/25 1128   BP: 144/75   Pulse: 76   Resp: 16   Temp: 97.1 °F (36.2 °C)   TempSrc: Temporal   SpO2: 99%   Weight: 61.7 kg (136 lb)   PainSc: 0-No pain     Body mass index is 22.63 kg/m².     Neck: Short, supple.  No detectable cervical or periclavicular lymphadenopathy  Heart: Regular rate and rhythm respiratory: Lungs appear clear bilaterally  Integumentary: No significant erythema noted over the treated breast.  Patient has mild erythema over the upper sternal region  Breasts: Well-healed surgical scars noted in the left breast  Extremities: No left arm swelling noted      Current Total XRT Dose (cGY):    Radiation Treatments       Active   Plans   L Boost DIBH   Most recent treatment: Dose planned: 200 cGy (fraction 4 on 5/28/2025)   Total: Dose planned: 1,000 cGy (5 fractions)   Elapsed Days: 29      Reference Points   PTV   Most recent treatment: Dose given: 266 cGy (on 5/21/2025)   Total: Dose given: 4,256 cGy   Elapsed Days: 22      PTV Boost   Most recent treatment: Dose given: 200 cGy (on 5/28/2025)   Total: Dose given: 800 cGy   Elapsed Days: 29           Historical   Plans   Lt Brst [facility]   Most recent treatment: Dose planned: 266 cGy (fraction 16 on 5/21/2025)   Total: Dose planned: 4,256 cGy (16 fractions)   Elapsed Days: 22                     Plan      Plan:   Patient was seen today for an on treatment visit. Patient is receiving radiation therapy to the tumor bed site. Patient is stable and tolerating radiation therapy well with minimal side effects. Continue with radiation therapy.     Patient will complete radiotherapy on 05/29/2025.  The patient has follow-up appointments with Dr. Norwood on June 26, 2025 and with this service on 08/08/2025.  Post radiotherapy skin care measures were discussed.  I anticipate that the patient will receive endocrine therapy under Dr. Norwood's supervision.  Post radiotherapy/postsurgical surveillance measures were discussed.    I have reviewed treatment setup notes, checked and approved the daily guidance images. I reviewed dose delivery, treatment parameters and deemed them appropriate. We plan to continue radiation therapy as prescribed.     Digital speech recognition software was used to dictate parts of this note, some dictation errors may occur.    Duane Thomas MD   05/28/25 11:36 EDT

## 2025-05-29 ENCOUNTER — HOSPITAL ENCOUNTER (OUTPATIENT)
Dept: RADIATION ONCOLOGY | Facility: HOSPITAL | Age: 80
Discharge: HOME | End: 2025-05-29

## 2025-05-29 LAB
RAD ONC ARIA COURSE ID: NORMAL
RAD ONC ARIA COURSE INTENT: NORMAL
RAD ONC ARIA COURSE LAST TREATMENT DATE: NORMAL
RAD ONC ARIA COURSE START DATE: NORMAL
RAD ONC ARIA COURSE TREATMENT ELAPSED DAYS: 30
RAD ONC ARIA FIRST TREATMENT DATE: NORMAL
RAD ONC ARIA PLAN FRACTIONS TREATED TO DATE: 5
RAD ONC ARIA PLAN ID: NORMAL
RAD ONC ARIA PLAN PRESCRIBED DOSE PER FRACTION: 2 GY
RAD ONC ARIA PLAN PRIMARY REFERENCE POINT: NORMAL
RAD ONC ARIA PLAN TOTAL FRACTIONS PRESCRIBED: 5
RAD ONC ARIA PLAN TOTAL PRESCRIBED DOSE: 1000 CGY
RAD ONC ARIA REFERENCE POINT DOSAGE GIVEN TO DATE: 10 GY
RAD ONC ARIA REFERENCE POINT ID: NORMAL
RAD ONC ARIA REFERENCE POINT SESSION DOSAGE GIVEN: 2 GY

## 2025-05-29 PROCEDURE — G6002 STEREOSCOPIC X-RAY GUIDANCE: HCPCS | Performed by: RADIOLOGY

## 2025-05-29 PROCEDURE — 77387 GUIDANCE FOR RADJ TX DLVR: CPT | Performed by: RADIOLOGY

## 2025-05-29 PROCEDURE — 77412 RADIATION TX DELIVERY LVL 3: CPT | Performed by: RADIOLOGY

## 2025-05-30 LAB
RAD ONC ARIA COURSE END DATE: NORMAL
RAD ONC ARIA COURSE ID: NORMAL
RAD ONC ARIA COURSE INTENT: NORMAL
RAD ONC ARIA COURSE LAST TREATMENT DATE: NORMAL
RAD ONC ARIA COURSE START DATE: NORMAL
RAD ONC ARIA COURSE TREATMENT ELAPSED DAYS: 30
RAD ONC ARIA FIRST TREATMENT DATE: NORMAL
RAD ONC ARIA PLAN FRACTIONS TREATED TO DATE: 16
RAD ONC ARIA PLAN FRACTIONS TREATED TO DATE: 5
RAD ONC ARIA PLAN ID: NORMAL
RAD ONC ARIA PLAN ID: NORMAL
RAD ONC ARIA PLAN NAME: NORMAL
RAD ONC ARIA PLAN NAME: NORMAL
RAD ONC ARIA PLAN PRESCRIBED DOSE PER FRACTION: 2 GY
RAD ONC ARIA PLAN PRESCRIBED DOSE PER FRACTION: 2.66 GY
RAD ONC ARIA PLAN PRIMARY REFERENCE POINT: NORMAL
RAD ONC ARIA PLAN PRIMARY REFERENCE POINT: NORMAL
RAD ONC ARIA PLAN TOTAL FRACTIONS PRESCRIBED: 16
RAD ONC ARIA PLAN TOTAL FRACTIONS PRESCRIBED: 5
RAD ONC ARIA PLAN TOTAL PRESCRIBED DOSE: 1000 CGY
RAD ONC ARIA PLAN TOTAL PRESCRIBED DOSE: 4256 CGY
RAD ONC ARIA REFERENCE POINT DOSAGE GIVEN TO DATE: 10 GY
RAD ONC ARIA REFERENCE POINT DOSAGE GIVEN TO DATE: 42.56 GY
RAD ONC ARIA REFERENCE POINT ID: NORMAL
RAD ONC ARIA REFERENCE POINT ID: NORMAL

## 2025-06-27 ENCOUNTER — OFFICE VISIT (OUTPATIENT)
Dept: SURGERY | Facility: CLINIC | Age: 80
End: 2025-06-27
Payer: MEDICARE

## 2025-06-27 VITALS
SYSTOLIC BLOOD PRESSURE: 138 MMHG | BODY MASS INDEX: 22.33 KG/M2 | HEART RATE: 70 BPM | HEIGHT: 65 IN | WEIGHT: 134 LBS | OXYGEN SATURATION: 98 % | DIASTOLIC BLOOD PRESSURE: 70 MMHG

## 2025-06-27 DIAGNOSIS — C50.912 MALIGNANT NEOPLASM OF LEFT BREAST IN FEMALE, ESTROGEN RECEPTOR POSITIVE, UNSPECIFIED SITE OF BREAST: Primary | ICD-10-CM

## 2025-06-27 DIAGNOSIS — Z17.0 MALIGNANT NEOPLASM OF LEFT BREAST IN FEMALE, ESTROGEN RECEPTOR POSITIVE, UNSPECIFIED SITE OF BREAST: Primary | ICD-10-CM

## 2025-06-27 DIAGNOSIS — M81.0 OSTEOPOROSIS, UNSPECIFIED OSTEOPOROSIS TYPE, UNSPECIFIED PATHOLOGICAL FRACTURE PRESENCE: ICD-10-CM

## 2025-06-27 RX ORDER — TAMOXIFEN CITRATE 20 MG/1
20 TABLET ORAL DAILY
Qty: 30 TABLET | Refills: 6 | Status: SHIPPED | OUTPATIENT
Start: 2025-06-27 | End: 2026-01-23

## 2025-06-27 RX ORDER — ALENDRONATE SODIUM 70 MG/1
70 TABLET ORAL
Qty: 4 TABLET | Refills: 11 | Status: SHIPPED | OUTPATIENT
Start: 2025-06-27 | End: 2026-06-27

## 2025-06-27 NOTE — PROGRESS NOTES
Subjective   Delia Rodriguez is a 79 y.o. female is here today for follow-up.    History of Present Illness  Ms. Rodriguez was seen in the office today for breast cancer follow-up.  She is status post a left lumpectomy and sentinel node biopsy on 2/25/2025. Final pathology demonstrated a T1c N0 grade 2 invasive ductal carcinoma, ER positive, LA positive, HER2 negative. Margins of resection are negative.  MammaPrint returned high risk luminal B.  Patient was not willing to consider chemotherapy but did undergo radiation which she finished on 5/29/2025.  She did have a acute radiation changes but states that these are improving.  Patient's most recent bone density was 12/6/2024 which demonstrated osteoporosis with a T-score of -2.6.  The patient was just recently started on vitamin D.  Allergies   Allergen Reactions    Penicillins Hives     Beta lactam allergy details  Antibiotic reaction: hives  Age at reaction: adult  Dose to reaction time: (!) hours  Reason for antibiotic: sore throat  Epinephrine required for reaction?: no  Tolerated antibiotics: unknown           Current Outpatient Medications   Medication Sig Dispense Refill    aspirin 81 MG EC tablet Take 1 tablet by mouth Every 4 (Four) Hours As Needed. STATES HASN'T TAKEN FOR A FEW DAYS      clopidogrel (PLAVIX) 75 MG tablet Take 1 tablet by mouth Daily. 30 tablet 3    lisinopril (PRINIVIL,ZESTRIL) 10 MG tablet Take 1 tablet by mouth Daily.      methocarbamol (ROBAXIN) 500 MG tablet Take 1 tablet by mouth.      simvastatin (ZOCOR) 10 MG tablet Take 1 tablet by mouth Every Other Day.       No current facility-administered medications for this visit.     Past Medical History:   Diagnosis Date    Arthritis     Carotid stenosis     Coronary artery disease     Elevated cholesterol     GERD (gastroesophageal reflux disease)     Hyperlipidemia     Hypertension     IBS (irritable bowel syndrome)     Pre-diabetes     Sleep apnea     Thyroid nodule 3/19/2025  "    Past Surgical History:   Procedure Laterality Date    ABDOMINAL SURGERY      BREAST LUMPECTOMY WITH SENTINEL NODE BIOPSY AND AXILLARY NODE DISSECTION Left 2/25/2025    Procedure: BREAST LUMPECTOMY WITH SENTINEL NODE BIOPSY, left breast;  Surgeon: Divya Norwood MD;  Location: [facility] OR;  Service: General;  Laterality: Left;    BREAST SURGERY      right breast biopsy    CAROTID ENDARTERECTOMY Left 11/05/2020    Procedure: CAROTID ENDARTERECTOMY WITH EEG LEFT;  Surgeon: Gerardo Paige MD;  Location: [facility] OR;  Service: Vascular;  Laterality: Left;    CATARACT EXTRACTION, BILATERAL      can't recall which eye for sure    COLONOSCOPY      ENDOSCOPY      EYE SURGERY      GALLBLADDER SURGERY      HYSTERECTOMY         Pertinent Review of Systems      Objective   /70   Pulse 70   Ht 165.1 cm (65\")   Wt 60.8 kg (134 lb)   SpO2 98%   BMI 22.30 kg/m²    Physical Exam  Well-developed well-nourished female  Neck:  No supraclavicular adenopathy  Lungs:  Respiratory effort normal. Auscultation: Clear, without wheezes, rhonchi, rales  Heart:  Regular rate and rhythm, without murmur, gallop, rub.  No pedal edema  Breasts: On visual inspection there is some mild radiation changes to the left breast.  Examination of the right breast demonstrates no discrete mass, skin change, or axillary adenopathy.  Examination left breast demonstrates surgical scars in the lateral 3 o'clock position and the axilla.  No palpable mass or adenopathy.       Procedures     Results/Data:  Imaging:   Notes: Records from radiation oncology were reviewed  Lab:   Other:     Assessment & Plan   Left breast T1c N0 grade 2 invasive ductal carcinoma, ER positive, NV positive, HER2 negative.  High risk mammaprint.  Status post lumpectomy with radiation    Plan: Given osteoporosis we will start tamoxifen  Patient agreeable to starting Fosamax and this was ordered as well.  Patient will be scheduled for her new baseline mammogram of the left " breast in September, 2025 with return to the office following       Discussion/Summary:    Time spent:     BMI is within normal parameters. No other follow-up for BMI required.       @[email or web address]    This document has been electronically signed by        June 27, 2025 13:20 EDT    Please note that portions of this note were completed with a voice recognition program.

## 2025-06-30 DIAGNOSIS — R92.8 OTHER ABNORMAL AND INCONCLUSIVE FINDINGS ON DIAGNOSTIC IMAGING OF BREAST: Primary | ICD-10-CM

## 2025-06-30 DIAGNOSIS — Z17.0 MALIGNANT NEOPLASM OF LEFT BREAST IN FEMALE, ESTROGEN RECEPTOR POSITIVE, UNSPECIFIED SITE OF BREAST: ICD-10-CM

## 2025-06-30 DIAGNOSIS — C50.912 MALIGNANT NEOPLASM OF LEFT BREAST IN FEMALE, ESTROGEN RECEPTOR POSITIVE, UNSPECIFIED SITE OF BREAST: ICD-10-CM

## 2025-07-17 ENCOUNTER — TELEPHONE (OUTPATIENT)
Dept: SURGERY | Facility: CLINIC | Age: 80
End: 2025-07-17
Payer: MEDICARE

## 2025-07-17 NOTE — TELEPHONE ENCOUNTER
"Miss Lin had called and said the read out she got with her Tamoxifen had a warning about taking it with Plavix. Dr. Norwood tried to find information about this and could not find anything. I call the pharmacist down stairs. He said that the computer system that flags drugs when filling them did not show anything. So He had a another source \"[employer]\" that said Plavix slowed down the break down of Tamoxifen but it was not a warning or a reason not to take it. I hope this was helpful. I encouraged the patient to at least try the medication and to call if she has any problems.  "

## 2025-08-08 ENCOUNTER — OFFICE VISIT (OUTPATIENT)
Dept: RADIATION ONCOLOGY | Facility: HOSPITAL | Age: 80
End: 2025-08-08
Payer: MEDICARE

## 2025-08-08 VITALS
RESPIRATION RATE: 16 BRPM | OXYGEN SATURATION: 96 % | BODY MASS INDEX: 22.96 KG/M2 | DIASTOLIC BLOOD PRESSURE: 72 MMHG | SYSTOLIC BLOOD PRESSURE: 151 MMHG | HEART RATE: 78 BPM | WEIGHT: 138 LBS | TEMPERATURE: 97.5 F

## 2025-08-08 DIAGNOSIS — Z17.0 MALIGNANT NEOPLASM OF UPPER-OUTER QUADRANT OF LEFT BREAST IN FEMALE, ESTROGEN RECEPTOR POSITIVE: Primary | ICD-10-CM

## 2025-08-08 DIAGNOSIS — C50.412 MALIGNANT NEOPLASM OF UPPER-OUTER QUADRANT OF LEFT BREAST IN FEMALE, ESTROGEN RECEPTOR POSITIVE: Primary | ICD-10-CM

## 2025-08-08 PROCEDURE — G0463 HOSPITAL OUTPT CLINIC VISIT: HCPCS | Performed by: RADIOLOGY

## 2025-08-11 ENCOUNTER — OFFICE VISIT (OUTPATIENT)
Age: 80
End: 2025-08-11
Payer: MEDICARE

## 2025-08-11 VITALS
BODY MASS INDEX: 22.82 KG/M2 | HEART RATE: 71 BPM | OXYGEN SATURATION: 98 % | SYSTOLIC BLOOD PRESSURE: 134 MMHG | WEIGHT: 137 LBS | HEIGHT: 65 IN | DIASTOLIC BLOOD PRESSURE: 68 MMHG

## 2025-08-11 DIAGNOSIS — E04.2 MULTIPLE THYROID NODULES: Primary | ICD-10-CM

## 2025-08-11 PROBLEM — E04.1 THYROID NODULE: Status: RESOLVED | Noted: 2025-03-19 | Resolved: 2025-08-11

## (undated) DEVICE — GLV SURG BIOGELULTRATOUCH POLYISPRN PF LF SZ7 STRL BX/50

## (undated) DEVICE — 3M™ IOBAN™ 2 ANTIMICROBIAL INCISE DRAPE 6650EZ: Brand: IOBAN™ 2

## (undated) DEVICE — SPNG GZ WOVN 4X4IN 12PLY 10/BX STRL

## (undated) DEVICE — SUT SILK 3/0 TIES 18IN A184H

## (undated) DEVICE — SUT PROLN 7/0 BV1 D/A 24IN 8702H

## (undated) DEVICE — JACKSON-PRATT 100CC BULB RESERVOIR: Brand: CARDINAL HEALTH

## (undated) DEVICE — CVR PROB ULTRASND CIVFLEX GEN/PURP TELESCP/FOLD 5.5X58IN LF

## (undated) DEVICE — SUT VIC RAPD SZ 2/0 36IN CT1 VR945 BX/12

## (undated) DEVICE — CONTAINER,SPECIMEN,OR STERILE,4OZ: Brand: MEDLINE

## (undated) DEVICE — KT INK TISS MARGINMARKER STD 6COLOR

## (undated) DEVICE — NDL HYPO SFTY PROEDGE 27G 1 1/4IN GRY

## (undated) DEVICE — SUT PROLN 6/0 C1 D/A 30IN 8706H

## (undated) DEVICE — DRAPE,T,LAPARO,TRANS,STERILE: Brand: MEDLINE

## (undated) DEVICE — DEV SPECI TRANSPEC W/PERF PLT

## (undated) DEVICE — SUT VICRYL 3/0 CT1 27IN J258H

## (undated) DEVICE — PATIENT RETURN ELECTRODE, SINGLE-USE, CONTACT QUALITY MONITORING, ADULT, WITH 9FT CORD, FOR PATIENTS WEIGING OVER 33LBS. (15KG): Brand: MEGADYNE

## (undated) DEVICE — STCKNT IMPERV 9X36IN STRL

## (undated) DEVICE — SUT SILK 2/0 SH 30IN K833H

## (undated) DEVICE — AMD ANTIMICROBIAL NON-ADHERENT ISLAND DRESSING,0.2% POLYHEXAMETHYLENE BIGUANIDE HCI (PHMB): Brand: TELFA

## (undated) DEVICE — ROSEBUD DISSECTORS: Brand: DEROYAL

## (undated) DEVICE — BNDG ELAS CO-FLEX SLF ADHR 4IN5YD LF STRL

## (undated) DEVICE — 3M™ STERI-DRAPE™ INSTRUMENT POUCH 1018: Brand: STERI-DRAPE™

## (undated) DEVICE — BRA RADITON/THERP THERAPORT SER2161 FRNT/CLS HK/LP LG WHT

## (undated) DEVICE — PK BASIC 70

## (undated) DEVICE — SUT VIC 3/0 SH 27IN J416H

## (undated) DEVICE — DEV TRNSPEC

## (undated) DEVICE — DECANT BG O JET

## (undated) DEVICE — BLD SCLPL BEAVR MINI STR 2BVL 180D LF

## (undated) DEVICE — SUT MNCRYL 3/0 SH 27IN UD MCP416H

## (undated) DEVICE — DRAIN JACKSON PRATT ROUND 15FR: Brand: CARDINAL HEALTH

## (undated) DEVICE — PK VASC 10

## (undated) DEVICE — SUCTION CANISTER, 2500CC, RIGID: Brand: DEROYAL

## (undated) DEVICE — DRP UTIL 2/LAYR W/TP 15X26IN STRL PK/4

## (undated) DEVICE — SUT SILK 2/0 TIES 18IN A185H

## (undated) DEVICE — GLV SURG PREMIERPRO MIC LTX PF SZ7 BRN

## (undated) DEVICE — Device

## (undated) DEVICE — SHEET,DRAPE,53X77,STERILE: Brand: MEDLINE

## (undated) DEVICE — SKIN AFFIX SURG ADHESIVE 72/CS 0.55ML: Brand: MEDLINE

## (undated) DEVICE — INTENDED FOR TISSUE SEPARATION, AND OTHER PROCEDURES THAT REQUIRE A SHARP SURGICAL BLADE TO PUNCTURE OR CUT.: Brand: BARD-PARKER ® STAINLESS STEEL BLADES

## (undated) DEVICE — VIOLET BRAIDED (POLYGLACTIN 910), SYNTHETIC ABSORBABLE SUTURE: Brand: COATED VICRYL

## (undated) DEVICE — DRAPE,UTILTY,TAPE,15X26, 4EA/PK: Brand: MEDLINE

## (undated) DEVICE — STRIP,CLOSURE,WOUND,MEDI-STRIP,1/2X4: Brand: MEDLINE

## (undated) DEVICE — HOLDER: Brand: DEROYAL

## (undated) DEVICE — 1 ML TUBERCULIN SYRINGE REGULAR TIP: Brand: MONOJECT

## (undated) DEVICE — CVR HNDL LIGHT RIGID

## (undated) DEVICE — ELECTRD BLD EZ CLN MOD 4IN

## (undated) DEVICE — UNDYED BRAIDED (POLYGLACTIN 910), SYNTHETIC ABSORBABLE SUTURE: Brand: COATED VICRYL

## (undated) DEVICE — STERILE (15.2 X 244CM) POLYETHYLENE ACCORDION-FOLDED COVER WITH ATTACHED (3CM) NEOGUARD™ TIP: Brand: SURGI-TIP TRANSDUCER COVER